# Patient Record
Sex: MALE | Race: WHITE | NOT HISPANIC OR LATINO | Employment: OTHER | URBAN - NONMETROPOLITAN AREA
[De-identification: names, ages, dates, MRNs, and addresses within clinical notes are randomized per-mention and may not be internally consistent; named-entity substitution may affect disease eponyms.]

---

## 2017-03-20 ENCOUNTER — COMMUNICATION - GICH (OUTPATIENT)
Dept: FAMILY MEDICINE | Facility: OTHER | Age: 72
End: 2017-03-20

## 2017-03-20 DIAGNOSIS — I10 ESSENTIAL (PRIMARY) HYPERTENSION: ICD-10-CM

## 2017-07-12 ENCOUNTER — AMBULATORY - GICH (OUTPATIENT)
Dept: SCHEDULING | Facility: OTHER | Age: 72
End: 2017-07-12

## 2017-08-02 ENCOUNTER — OFFICE VISIT - GICH (OUTPATIENT)
Dept: FAMILY MEDICINE | Facility: OTHER | Age: 72
End: 2017-08-02

## 2017-08-02 ENCOUNTER — HISTORY (OUTPATIENT)
Dept: FAMILY MEDICINE | Facility: OTHER | Age: 72
End: 2017-08-02

## 2017-08-02 DIAGNOSIS — T82.330A: ICD-10-CM

## 2017-08-02 DIAGNOSIS — Z01.818 ENCOUNTER FOR OTHER PREPROCEDURAL EXAMINATION: ICD-10-CM

## 2017-08-02 LAB
ABSOLUTE BASOPHILS - HISTORICAL: 0 THOU/CU MM
ABSOLUTE EOSINOPHILS - HISTORICAL: 0.5 THOU/CU MM
ABSOLUTE IMMATURE GRANULOCYTES(METAS,MYELOS,PROS) - HISTORICAL: 0 THOU/CU MM
ABSOLUTE LYMPHOCYTES - HISTORICAL: 2.1 THOU/CU MM (ref 0.9–2.9)
ABSOLUTE MONOCYTES - HISTORICAL: 0.6 THOU/CU MM
ABSOLUTE NEUTROPHILS - HISTORICAL: 4.9 THOU/CU MM (ref 1.7–7)
BASOPHILS # BLD AUTO: 0.2 %
CREAT SERPL-MCNC: 1.01 MG/DL (ref 0.7–1.3)
EOSINOPHIL NFR BLD AUTO: 6.5 %
ERYTHROCYTE [DISTWIDTH] IN BLOOD BY AUTOMATED COUNT: 13.6 % (ref 11.5–15.5)
GFR IF NOT AFRICAN AMERICAN - HISTORICAL: >60 ML/MIN/1.73M2
HCT VFR BLD AUTO: 51.6 % (ref 37–53)
HEMOGLOBIN: 17.5 G/DL (ref 13.5–17.5)
IMMATURE GRANULOCYTES(METAS,MYELOS,PROS) - HISTORICAL: 0.2 %
LYMPHOCYTES NFR BLD AUTO: 25.5 % (ref 20–44)
MCH RBC QN AUTO: 32.2 PG (ref 26–34)
MCHC RBC AUTO-ENTMCNC: 33.9 G/DL (ref 32–36)
MCV RBC AUTO: 95 FL (ref 80–100)
MONOCYTES NFR BLD AUTO: 6.8 %
NEUTROPHILS NFR BLD AUTO: 60.8 % (ref 42–72)
PLATELET # BLD AUTO: 197 THOU/CU MM (ref 140–440)
PMV BLD: 9.9 FL (ref 6.5–11)
RED BLOOD COUNT - HISTORICAL: 5.43 MIL/CU MM (ref 4.3–5.9)
WHITE BLOOD COUNT - HISTORICAL: 8.1 THOU/CU MM (ref 4.5–11)

## 2017-08-10 ENCOUNTER — OFFICE VISIT - GICH (OUTPATIENT)
Dept: FAMILY MEDICINE | Facility: OTHER | Age: 72
End: 2017-08-10

## 2017-08-10 ENCOUNTER — HISTORY (OUTPATIENT)
Dept: FAMILY MEDICINE | Facility: OTHER | Age: 72
End: 2017-08-10

## 2017-08-10 ENCOUNTER — AMBULATORY - GICH (OUTPATIENT)
Dept: SCHEDULING | Facility: OTHER | Age: 72
End: 2017-08-10

## 2017-08-10 DIAGNOSIS — L29.89 OTHER PRURITUS: ICD-10-CM

## 2017-08-10 ASSESSMENT — PATIENT HEALTH QUESTIONNAIRE - PHQ9: SUM OF ALL RESPONSES TO PHQ QUESTIONS 1-9: 0

## 2017-08-31 ENCOUNTER — AMBULATORY - GICH (OUTPATIENT)
Dept: SCHEDULING | Facility: OTHER | Age: 72
End: 2017-08-31

## 2017-09-16 ENCOUNTER — COMMUNICATION - GICH (OUTPATIENT)
Dept: FAMILY MEDICINE | Facility: OTHER | Age: 72
End: 2017-09-16

## 2017-09-16 DIAGNOSIS — I10 ESSENTIAL (PRIMARY) HYPERTENSION: ICD-10-CM

## 2017-11-16 ENCOUNTER — COMMUNICATION - GICH (OUTPATIENT)
Dept: FAMILY MEDICINE | Facility: OTHER | Age: 72
End: 2017-11-16

## 2017-11-16 DIAGNOSIS — I10 ESSENTIAL (PRIMARY) HYPERTENSION: ICD-10-CM

## 2017-11-24 ENCOUNTER — COMMUNICATION - GICH (OUTPATIENT)
Dept: FAMILY MEDICINE | Facility: OTHER | Age: 72
End: 2017-11-24

## 2017-11-24 DIAGNOSIS — I10 ESSENTIAL (PRIMARY) HYPERTENSION: ICD-10-CM

## 2017-11-28 ENCOUNTER — AMBULATORY - GICH (OUTPATIENT)
Dept: LAB | Facility: OTHER | Age: 72
End: 2017-11-28

## 2017-11-28 DIAGNOSIS — I10 ESSENTIAL (PRIMARY) HYPERTENSION: ICD-10-CM

## 2017-11-28 LAB
ANION GAP - HISTORICAL: 8 (ref 5–18)
BUN SERPL-MCNC: 16 MG/DL (ref 7–25)
BUN/CREAT RATIO - HISTORICAL: 16
CALCIUM SERPL-MCNC: 9.7 MG/DL (ref 8.6–10.3)
CHLORIDE SERPLBLD-SCNC: 105 MMOL/L (ref 98–107)
CO2 SERPL-SCNC: 26 MMOL/L (ref 21–31)
CREAT SERPL-MCNC: 1.01 MG/DL (ref 0.7–1.3)
GFR IF NOT AFRICAN AMERICAN - HISTORICAL: >60 ML/MIN/1.73M2
GLUCOSE SERPL-MCNC: 119 MG/DL (ref 70–105)
POTASSIUM SERPL-SCNC: 4 MMOL/L (ref 3.5–5.1)
SODIUM SERPL-SCNC: 139 MMOL/L (ref 133–143)

## 2017-12-28 NOTE — TELEPHONE ENCOUNTER
Patient Information     Patient Name MRN Faustino Suggs [id number] Male 1945      Telephone Encounter by Kim Coto RN at 2017  9:59 AM     Author:  Kim Coto RN Service:  (none) Author Type:  NURS- Registered Nurse     Filed:  2017 10:00 AM Encounter Date:  2017 Status:  Signed     :  Kim Coto RN (NURS- Registered Nurse)            Spoke with Patient and let him know that he needs to make a lab only appointment to have his potassium level checked. Next refill can be filled as appropriate through August once this has been completed. Patient transferred to scheduling line to set up lab-only appointment. Kim Coot RN .............. 2017  10:00 AM

## 2017-12-28 NOTE — ADDENDUM NOTE
Patient Information     Patient Name MRN Faustino Suggs [id number] Male 1945      Addendum Note by Diana Sargent MD at 2017  8:14 AM     Author:  Diana Sargent MD Service:  (none) Author Type:  Physician     Filed:  2017  8:14 AM Encounter Date:  2017 Status:  Signed     :  Diana Sargent MD (Physician)       Addended by: DIANA SARGENT on: 2017 08:14 AM        Modules accepted: Orders

## 2017-12-28 NOTE — TELEPHONE ENCOUNTER
Patient Information     Patient Name MRN Sex Faustino Noyola [id number] Male 1945      Telephone Encounter by Kim Coto RN at 2017 10:33 AM     Author:  Kim Coto RN Service:  (none) Author Type:  NURS- Registered Nurse     Filed:  2017 11:34 AM Encounter Date:  2017 Status:  Signed     :  Kim Coto RN (NURS- Registered Nurse)            Ace Inhibitors    Office visit in the past 12 months or per provider note.    Last visit with DIANA BROWN was on: 08/10/2017 in [facility]  Next visit with DIANA BROWN is on: No future appointment listed with this provider  Next visit with Family Practice is on: No future appointment listed in this department    Lab test requirements:  Creatinine and Potassium annually, if ordering lab, order BMP.  CREATININE (mg/dL)    Date Value   2017 1.01     POTASSIUM (mmol/L)    Date Value   2016 4.1     Max refill for 12 months from last office visit or per provider note    Patient is due for follow-up lab: Potassium. Limited refill provided at this time. Twined message and/or letter sent for reminder to patient. Prescription refilled per RN Medication Refill Policy.................... Kim Coto RN ....................  2017   11:32 AM

## 2017-12-28 NOTE — PROGRESS NOTES
Patient Information     Patient Name MRN Faustino Suggs [id number] Male 1945      Progress Notes by Tristen Sargent MD at 2017 10:30 AM     Author:  Tristen Sargent MD Service:  (none) Author Type:  Physician     Filed:  8/3/2017  1:38 PM Encounter Date:  2017 Status:  Signed     :  Tristen Sargent MD (Physician)            .

## 2017-12-28 NOTE — TELEPHONE ENCOUNTER
Patient Information     Patient Name MRFaustino Brasher [id number] Male 1945      Telephone Encounter by Kim Coto RN at 2017  1:21 PM     Author:  Kim Coto RN Service:  (none) Author Type:  NURS- Registered Nurse     Filed:  2017  1:25 PM Encounter Date:  2017 Status:  Signed     :  Kim Coto RN (NURS- Registered Nurse)            Beta Blockers     Office visit in the past 12 months or per provider note.    Last visit with DIANA BROWN was on: 08/10/2017 in [facility]  Next visit with DIANA BROWN is on: No future appointment listed with this provider  Next visit with Family Practice is on: No future appointment listed in this department    Max refill for 12 months from last office visit or per provider note.    Prescription refilled per RN Medication Refill Policy.................... Kim Coto RN ....................  2017   1:24 PM

## 2017-12-28 NOTE — TELEPHONE ENCOUNTER
Patient Information     Patient Name MRN Faustino Suggs [id number] Male 1945      Telephone Encounter by Yulia De La Paz at 2017  3:35 PM     Author:  Yulia De La Paz Service:  (none) Author Type:  (none)     Filed:  2017  3:37 PM Encounter Date:  2017 Status:  Signed     :  Yulia De La Paz            Spoke with patient he was under the understanding that both his meds would be refilled for the year at the time of his Pre-op apt. In August. Please advise. Yulia De La Paz LPN......................2017 3:36 PM

## 2017-12-28 NOTE — TELEPHONE ENCOUNTER
Patient Information     Patient Name MRN Faustino Suggs [id number] Male 1945      Telephone Encounter by Kim Coto RN at 2017  8:39 AM     Author:  Kim Coto RN Service:  (none) Author Type:  NURS- Registered Nurse     Filed:  2017  8:41 AM Encounter Date:  2017 Status:  Signed     :  Kim Coto RN (NURS- Registered Nurse)            Patient was sent letter because Potassium hasn't been checked in over one year. If you would like Patient to set up lab only appointment, please place orders and route to nursing for Patient notification. Kim Coto RN .............. 2017  8:40 AM

## 2017-12-28 NOTE — PROGRESS NOTES
Patient Information     Patient Name MRN Sex Faustino Noyola [id number] Male 1945      Progress Notes by Tristen Sargent MD at 8/10/2017  1:15 PM     Author:  Tristen Sargent MD Service:  (none) Author Type:  Physician     Filed:  8/10/2017  1:33 PM Encounter Date:  8/10/2017 Status:  Signed     :  Tristen Sargent MD (Physician)            SUBJECTIVE:    Faustino Diallo is a 71 y.o. male who presents for follow-up jock rash    HPI Comments: Patient arrives here for follow-up jock rash. He has been using Lamisil with fairly decent result although he still has quite a bit of redness. Is also changed to boxer shorts. States it seems to improve. Surgery was canceled. Rescheduled for the .      No Known Allergies,   Family History       Problem   Relation Age of Onset     Other  Mother      Parkinson's disease       Other  Father      dementia       Other  Mother       dementia       Stroke  Father     and   Current Outpatient Prescriptions on File Prior to Visit       Medication  Sig Dispense Refill     aspirin enteric coated 81 mg tablet Take 1 tablet by mouth once daily with a meal.  0     lisinopril (PRINIVIL; ZESTRIL) 20 mg tablet Take 1 tablet by mouth once daily. 90 tablet 1     metoprolol tartrate (LOPRESSOR) 50 mg tablet Take 1 tablet by mouth 2 times daily. 180 tablet 4     MULTIVIT-MIN/FA/LYCOPEN/LUTEIN (CENTRUM SILVER MEN ORAL) Take 1 tablet by mouth once daily.       No current facility-administered medications on file prior to visit.        REVIEW OF SYSTEMS:  ROS    OBJECTIVE:  /100  Pulse 84  Wt 92.3 kg (203 lb 6.4 oz)  BMI 26.12 kg/m2    EXAM:   Physical Exam   Constitutional: He is well-developed, well-nourished, and in no distress.   Skin:   The redness has improved. Markedly improved on the left.       ASSESSMENT/PLAN:    ICD-10-CM    1. Jock itch L29.8 fluconazole (DIFLUCAN) 200 mg tablet        Plan:  This shows some improvement. Treat one week of Diflucan. Hopefully the  improvement will continue. Follow-up as needed

## 2017-12-29 NOTE — H&P
Patient Information     Patient Name MRN Faustino Suggs [id number] Male 1945      H&P by Tristen Sargent MD at 2017 10:30 AM     Author:  Tristen Sargent MD Service:  (none) Author Type:  Physician     Filed:  8/3/2017  1:38 PM Encounter Date:  2017 Status:  Signed     :  Tristen Sargent MD (Physician)            PREOPERATIVE Anesthesia Medical Evaluation  Date of Exam: 2017    Nursing Notes:   Inés Lopez  2017 10:39 AM  Signed  Patient here for preop having an angiogram on 08/10/17 with  in [place]. He is due for refills on medications, due for updated EKG today. Inés Lopez LPN .......................2017  10:36 AM       HPI:  Patient is here at the request of Dr. Coyle  prior to undergoing angiography surgery on date noted above. 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 He'll be undergoing surgery to repair it. Surgery is scheduled to be done August 10.       Proposed anesthesia: gen  Anesthesia Complications: no  History of abnormal bleeding : no  History of Blood Transfusions: no      Cards risk factors:     History      Smoking Status        Current Every Day Smoker       Packs/day:  0.50     Years:  45.00   Smokeless Tobacco        Never Used       Comment: enc. to stop    ,   LDL CHOLESTEROL (mg/dL)    Date Value   2015 131 (H)   ,   BP Readings from Last 1 Encounters:    17 134/80     ,   GLUCOSE (mg/dL)    Date Value   2016 89   , No results found for: HGBA1C,  no early heart history in family    CPR: yes  Allergies: Review of patient's allergies indicates no known allergies.   Latex Allergy: no  Immunization History     Administered  Date(s) Administered     Influenza Virus, Unspecified 2008, 2012     Influenza, IIV3 (Age >=3 years) 10/02/2013, 2014         Preoperative Evaluation: Obstructive Sleep Apnea screening    S: Snore -  Do you snore loudly? (louder than  "talking or loud enough to be heard through closed doors)(NO)  T: Tired - Do you often feel tired, fatigued, or sleepy during the daytime?(NO)  O: Observed - Has anyone ever observed you stop breathing during your sleep?(NO)  P: Pressure - Do you have or are you being treated for high blood pressure?(YES)  B: BMI - BMI greater than 35kg/m2?(NO)  A: Age - Age over 50 years old?(YES)  N: Neck - Neck circumference greater than 40 cm?(NO)  G: Gender - Gender: Male?(YES)  Total number of \"YES\" responses:  3    Scoring: Low risk of SAGE 0-2  At Risk of SAGE: >3 High Risk of SAGE: 5-8      Problem List:   Patient Active Problem List     Diagnosis  Code     NEPHROLITHIASIS S/P  ESWL 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  Patient declined.       Past Surgical History:      Procedure  Laterality Date     ABDOMINAL AORTIC ANEURYSM REPAIR      endovascular with right renal       CYSTOSCOPY      cystoscopy with IV sedation       LITHOTRIPSY       Social History     Social History        Marital status:  Single     Spouse name: N/A     Number of children:  N/A     Years of education:  N/A     Occupational History      Not on file.     Social History Main Topics          Smoking status:   Current Every Day Smoker      Packs/day:  0.50      Years:  45.00      Smokeless tobacco:   Never Used       Comment: enc. to stop       Alcohol use   3.5 oz/week     7 drink(s) per week        Comment: occasional       Drug use:   No      Sexual activity:   Not on file      Other Topics  Concern     Not on file      Social History Narrative     He lives alone in [place].     Patient currently smokes. 1/2  ppd    Alcohol Use - yes 2 per " "night    retired  [employer] divorce times two no children        Preload  4/16/2013        Family History       Problem   Relation Age of Onset     Other  Mother      Parkinson's disease       Other  Father      dementia       Other  Mother       dementia       Stroke  Father         Current Medications:  Current Outpatient Rx       Medication  Sig Dispense Refill     aspirin enteric coated 81 mg tablet Take 1 tablet by mouth once daily with a meal.  0     lisinopril (PRINIVIL; ZESTRIL) 20 mg tablet Take 1 tablet by mouth once daily. 90 tablet 1     metoprolol tartrate (LOPRESSOR) 50 mg tablet Take 1 tablet by mouth 2 times daily. 180 tablet 4     MULTIVIT-MIN/FA/LYCOPEN/LUTEIN (CENTRUM SILVER MEN ORAL) Take 1 tablet by mouth once daily.       Medications have been reviewed by me and are current to the best of my knowledge and ability.    Recent use of: aspirin    HABITS:     Health Care Directive or Living Will: no    REVIEW OF SYSTEMS:  Fever/Chills or other infectious symptoms in past month:  (NO)   >10lb weight loss in past two months:  (NO)   General: Denies general constitutional problems  Eyes: Denies problems  Ears/Nose/Throat: Denies problems  Respiratory: Denies problems  Gastrointestinal: Denies problems  Musculoskeletal: Denies problems  Skin: Patient reports a long-term skin rash around his groin area. States is been there for years.  Neurologic: Denies problems  Psychiatric: Denies problems  Endocrine: Denies problems     EXAM:   /80  Pulse 72  Ht 1.88 m (6' 2\")  Wt 90.7 kg (200 lb)  BMI 25.68 kg/m2 Body mass index is 25.68 kg/(m^2).  General Appearance: Normal., Pleasant, alert, appropriate appearance for age. No acute distress  Head Exam: Normocephalic, without obvious abnormality.  Eye Exam: Normal external eye, conjunctiva, lids, cornea. AIXA.  Ear Exam: Normal TM's bilaterally. Normal auditory canals and external ears. Non-tender.  OroPharynx Exam: Dental hygiene adequate. Normal " buccal mucosa. Normal pharynx.  Chest/Respiratory Exam: Normal chest wall and respirations. Clear to auscultation.  Cardiovascular Exam: Regular rate and rhythm. S1, S2, no murmur, click, gallop, or rubs.  Gastrointestinal Exam: Soft, nontender, no abnormal masses or organomegaly.  Musculoskeletal Exam: Back is straight and non-tender, full ROM of upper and lower extremities.  Skin: Patient has a well demarcated rash erythematous in nature site scaling around his groin area. Bilateral consistent with a yeast dermatitis.    DIAGNOSTICS:   1. EKG:  appears normal, NSR  3. Labs:   Results for orders placed or performed in visit on 08/02/17       CREATININE       Result  Value Ref Range Status    CREATININE 1.01 0.70 - 1.30 mg/dL Final    GFR if African American >60 >60 ml/min/1.73m2 Final    GFR if not African American >60 >60 ml/min/1.73m2 Final   CBC WITH AUTO DIFFERENTIAL       Result  Value Ref Range Status    WHITE BLOOD COUNT         8.1 4.5 - 11.0 thou/cu mm Final    RED BLOOD COUNT           5.43 4.30 - 5.90 mil/cu mm Final    HEMOGLOBIN                17.5 13.5 - 17.5 g/dL Final    HEMATOCRIT                51.6 37.0 - 53.0 % Final    MCV                       95 80 - 100 fL Final    MCH                       32.2 26.0 - 34.0 pg Final    MCHC                      33.9 32.0 - 36.0 g/dL Final    RDW                       13.6 11.5 - 15.5 % Final    PLATELET COUNT            197 140 - 440 thou/cu mm Final    MPV                       9.9 6.5 - 11.0 fL Final    NEUTROPHILS               60.8 42.0 - 72.0 % Final    LYMPHOCYTES               25.5 20.0 - 44.0 % Final    MONOCYTES                 6.8 <12.0 % Final    EOSINOPHILS               6.5 <8.0 % Final    BASOPHILS                 0.2 <3.0 % Final    IMMATURE GRANULOCYTES(METAS,MYELOS,PROS) 0.2 % Final    ABSOLUTE NEUTROPHILS      4.9 1.7 - 7.0 thou/cu mm Final    ABSOLUTE LYMPHOCYTES      2.1 0.9 - 2.9 thou/cu mm Final    ABSOLUTE MONOCYTES        0.6 <0.9  thou/cu mm Final    ABSOLUTE EOSINOPHILS      0.5 (H) <0.5 thou/cu mm Final    ABSOLUTE BASOPHILS        0.0 <0.3 thou/cu mm Final    ABSOLUTE IMMATURE GRANULOCYTES(METAS,MYELOS,PROS) 0.0 <=0.3 thou/cu mm Final     laboratory reviewed in satisfactory  IMPRESSION:   Satisfactory Preoperative Anesthesia Medical Evaluation;    For above listed surgery and anesthesia:   Patient is low risk for perioperative complications.    Patient is on chronic pain medications (NO);   Patient is on antiplatlet/anticoagulation (YES) patient advised to continue the aspirin by his surgeon.  Other medications that need adjustment perioperatively (NO)        Patient may proceed with surgery pending approval by his surgeon concerning his yeast dermatitis with appropriate anesthesia.  Labs/ Ekg to be faxed to surgeon's office.   Patient informed NPO after midnight night prior to surgery, to avoid NSAIDS, ASA, fish oil, and flax seed  over the counter ten days prior to surgery.   If  febrile illness or productive cough, please contact the surgeon's office.             1. Preop examination      2. Endoleak of aortic graft (HC)    3  yeast dermatitis    Patient does have a L phone number for his surgeon and will contact his surgeon concerning his yeast dermatitis whether it would prevent him from proceeding with surgery. In the meantime patient will use Lamisil to his groin area. If patient has trouble getting a hold of his surgeon's office he will contact me.         Electronically Signed by: Tristen Sargent MD ....................  8/3/2017   1:37 PM   8/2/2017

## 2017-12-30 NOTE — NURSING NOTE
Patient Information     Patient Name MRN Faustino Suggs [id number] Male 1945      Nursing Note by Inés Lopez at 2017 10:30 AM     Author:  Inés Lopez Service:  (none) Author Type:  (none)     Filed:  2017 10:39 AM Encounter Date:  2017 Status:  Signed     :  Inés Lopez            Patient here for preop having an angiogram on 08/10/17 with  in [place]. He is due for refills on medications, due for updated EKG today. Inés Lopez LPN .......................2017  10:36 AM

## 2018-01-03 NOTE — TELEPHONE ENCOUNTER
Patient Information     Patient Name MRN Faustino Suggs [id number] Male 1945      Telephone Encounter by Bernadine Cortés RN at 3/20/2017  4:53 PM     Author:  Bernadine Cortés RN  Service:  (none) Author Type:  NURS- Registered Nurse     Filed:  3/21/2017  8:49 AM  Encounter Date:  3/20/2017 Status:  Addendum     :  Bernadine Cortés RN (NURS- Registered Nurse)        Related Notes: Original Note by Bernadine Cortés RN (NURS- Registered Nurse) filed at 3/20/2017  4:58 PM            Patient has not had follow up since dosage change. Should he be seen for office visit or Nurse blood pressure check? Please advise.    Ace Inhibitors    Office visit in the past 12 months or per provider note.    Last visit with DIANA BROWN was on: 2016 in [facility]  Next visit with DIANA BROWN is on: No future appointment listed with this provider  Next visit with Family Practice is on: No future appointment listed in this department    Lab test requirements:  Creatinine and Potassium annually, if ordering lab, order BMP.  CREATININE (mg/dL)    Date Value   2016 1.03     POTASSIUM (mmol/L)    Date Value   2016 4.1     BP Readings from Last 4 Encounters:    16 160/100   05/22/15 145/72   04/26/15 (!) 168/104   14 (!) 170/113       Max refill for 12 months from last office visit or per provider note    Prescription refilled per RN Medication Refill Policy.................... Bernadine Cortés RN ....................  3/21/2017   8:48 AM

## 2018-01-25 ENCOUNTER — DOCUMENTATION ONLY (OUTPATIENT)
Dept: FAMILY MEDICINE | Facility: OTHER | Age: 73
End: 2018-01-25

## 2018-01-25 VITALS
WEIGHT: 200 LBS | DIASTOLIC BLOOD PRESSURE: 100 MMHG | HEART RATE: 84 BPM | BODY MASS INDEX: 25.67 KG/M2 | WEIGHT: 203.4 LBS | HEIGHT: 74 IN | HEART RATE: 72 BPM | BODY MASS INDEX: 26.12 KG/M2 | SYSTOLIC BLOOD PRESSURE: 134 MMHG | SYSTOLIC BLOOD PRESSURE: 162 MMHG | DIASTOLIC BLOOD PRESSURE: 80 MMHG

## 2018-01-25 PROBLEM — I10 ESSENTIAL HYPERTENSION: Status: ACTIVE | Noted: 2018-01-25

## 2018-01-25 PROBLEM — B35.6 JOCK ITCH: Status: ACTIVE | Noted: 2017-08-10

## 2018-01-25 PROBLEM — I45.9 CARDIAC CONDUCTION DISORDER: Status: ACTIVE | Noted: 2018-01-25

## 2018-01-25 RX ORDER — METOPROLOL TARTRATE 50 MG
50 TABLET ORAL 2 TIMES DAILY
COMMUNITY
Start: 2017-11-27 | End: 2018-08-02

## 2018-01-25 RX ORDER — LISINOPRIL 20 MG/1
20 TABLET ORAL DAILY
COMMUNITY
Start: 2017-11-27 | End: 2018-08-02

## 2018-01-25 RX ORDER — ASPIRIN 81 MG/1
81 TABLET ORAL
COMMUNITY
Start: 2013-03-19 | End: 2022-01-01

## 2018-02-03 ASSESSMENT — PATIENT HEALTH QUESTIONNAIRE - PHQ9
SUM OF ALL RESPONSES TO PHQ QUESTIONS 1-9: 0
SUM OF ALL RESPONSES TO PHQ QUESTIONS 1-9: 0

## 2018-07-23 NOTE — PROGRESS NOTES
Patient Information     Patient Name  Faustino Diallo MRN  [id number] Sex  Male   1945      Letter by Tristen Sargent MD at      Author:  Tristen Sargent MD Service:  (none) Author Type:  (none)    Filed:   Encounter Date:  2017 Status:  (Other)           Faustino Diallo  [address]  [facility] [address]          2017    Dear Mr. Diallo:    This letter is to remind you that you are due for follow-up Potassium lab draw with Tristen Sargent MD. Your last comprehensive visit was more than 12 months ago.    A LIMITED refill of         lisinopril (PRINIVIL; ZESTRIL) 20 mg tablet     has been called into your pharmacy. Additional refills require you to complete this appointment.    Please call the clinic at [phone] to schedule your appointment.    If you should require additional refills before your scheduled appointment, please contact your pharmacy and we will refill your medication until the date of your appointment.    If you are no longer seeing Tristen Sargent MD for primary care, please call to let us know. Doing so will remove you from our call/contact list.      Thank you for choosing [facility] and [facility] for your health care needs.    Sincerely,    Refill RN  [facility]

## 2018-07-23 NOTE — PROGRESS NOTES
Patient Information     Patient Name  Faustino Diallo MRN  [id number] Sex  Male   1945      Letter by Tristen Sargent MD at      Author:  Tristen Sargent MD Service:  (none) Author Type:  (none)    Filed:   Encounter Date:  2017 Status:  (Other)           Faustino Diallo  [address]   [place] [address]          2017    Dear Mr. Diallo:    Enclosed is a copy of your laboratory for review and as you can see it did come back satisfactory. Please call if you should have any questions.  Results for orders placed or performed in visit on 17       BASIC METABOLIC PANEL       Result  Value Ref Range Status    SODIUM 139 133 - 143 mmol/L Final    POTASSIUM 4.0 3.5 - 5.1 mmol/L Final    CHLORIDE 105 98 - 107 mmol/L Final    CO2,TOTAL 26 21 - 31 mmol/L Final    ANION GAP 8 5 - 18                 Final    GLUCOSE 119 (H) 70 - 105 mg/dL Final    CALCIUM 9.7 8.6 - 10.3 mg/dL Final    BUN 16 7 - 25 mg/dL Final    CREATININE 1.01 0.70 - 1.30 mg/dL Final    BUN/CREAT RATIO           16                 Final    GFR if African American >60 >60 ml/min/1.73m2 Final    GFR if not African American >60 >60 ml/min/1.73m2 Final     Tristen Sargent MD ....................  2017   4:19 PM

## 2018-07-24 NOTE — PROGRESS NOTES
Patient Information     Patient Name  Faustino Diallo MRN  [id number] Sex  Male   1945      Letter by Tristen Sargent MD at      Author:  Tristen Sargent MD Service:  (none) Author Type:  (none)    Filed:   Encounter Date:  2017 Status:  (Other)           Faustino Diallo  [address]  [place] [address]          August 3, 2017    Dear Mr. Diallo:    Enclosed is a copy of your laboratory testing which did come back satisfactory. Please call if you should have any questions.  Results for orders placed or performed in visit on 17       CREATININE       Result  Value Ref Range Status    CREATININE 1.01 0.70 - 1.30 mg/dL Final    GFR if African American >60 >60 ml/min/1.73m2 Final    GFR if not African American >60 >60 ml/min/1.73m2 Final   CBC WITH AUTO DIFFERENTIAL       Result  Value Ref Range Status    WHITE BLOOD COUNT         8.1 4.5 - 11.0 thou/cu mm Final    RED BLOOD COUNT           5.43 4.30 - 5.90 mil/cu mm Final    HEMOGLOBIN                17.5 13.5 - 17.5 g/dL Final    HEMATOCRIT                51.6 37.0 - 53.0 % Final    MCV                       95 80 - 100 fL Final    MCH                       32.2 26.0 - 34.0 pg Final    MCHC                      33.9 32.0 - 36.0 g/dL Final    RDW                       13.6 11.5 - 15.5 % Final    PLATELET COUNT            197 140 - 440 thou/cu mm Final    MPV                       9.9 6.5 - 11.0 fL Final    NEUTROPHILS               60.8 42.0 - 72.0 % Final    LYMPHOCYTES               25.5 20.0 - 44.0 % Final    MONOCYTES                 6.8 <12.0 % Final    EOSINOPHILS               6.5 <8.0 % Final    BASOPHILS                 0.2 <3.0 % Final    IMMATURE GRANULOCYTES(METAS,MYELOS,PROS) 0.2 % Final    ABSOLUTE NEUTROPHILS      4.9 1.7 - 7.0 thou/cu mm Final    ABSOLUTE LYMPHOCYTES      2.1 0.9 - 2.9 thou/cu mm Final    ABSOLUTE MONOCYTES        0.6 <0.9 thou/cu mm Final    ABSOLUTE EOSINOPHILS      0.5 (H) <0.5 thou/cu mm Final    ABSOLUTE  BASOPHILS        0.0 <0.3 thou/cu mm Final    ABSOLUTE IMMATURE GRANULOCYTES(METAS,MYELOS,PROS) 0.0 <=0.3 thou/cu mm Final     Tristen Sargent MD ....................  8/3/2017   4:18 PM

## 2018-08-02 ENCOUNTER — MEDICAL CORRESPONDENCE (OUTPATIENT)
Dept: HEALTH INFORMATION MANAGEMENT | Facility: OTHER | Age: 73
End: 2018-08-02

## 2018-08-02 ENCOUNTER — OFFICE VISIT (OUTPATIENT)
Dept: FAMILY MEDICINE | Facility: OTHER | Age: 73
End: 2018-08-02
Attending: FAMILY MEDICINE
Payer: MEDICARE

## 2018-08-02 VITALS
WEIGHT: 196 LBS | OXYGEN SATURATION: 99 % | DIASTOLIC BLOOD PRESSURE: 78 MMHG | SYSTOLIC BLOOD PRESSURE: 122 MMHG | HEART RATE: 63 BPM | HEIGHT: 75 IN | BODY MASS INDEX: 24.37 KG/M2

## 2018-08-02 DIAGNOSIS — H25.89 OTHER AGE-RELATED CATARACT OF RIGHT EYE: ICD-10-CM

## 2018-08-02 DIAGNOSIS — R97.20 ELEVATED PSA: ICD-10-CM

## 2018-08-02 DIAGNOSIS — I10 ESSENTIAL HYPERTENSION: ICD-10-CM

## 2018-08-02 DIAGNOSIS — Z01.818 PRE-OP EXAM: Primary | ICD-10-CM

## 2018-08-02 DIAGNOSIS — Z53.20 COLONOSCOPY REFUSED: ICD-10-CM

## 2018-08-02 PROBLEM — B35.6 JOCK ITCH: Status: RESOLVED | Noted: 2017-08-10 | Resolved: 2018-08-02

## 2018-08-02 LAB
ANION GAP SERPL CALCULATED.3IONS-SCNC: 6 MMOL/L (ref 3–14)
BUN SERPL-MCNC: 14 MG/DL (ref 7–25)
CALCIUM SERPL-MCNC: 9.5 MG/DL (ref 8.6–10.3)
CHLORIDE SERPL-SCNC: 105 MMOL/L (ref 98–107)
CO2 SERPL-SCNC: 29 MMOL/L (ref 21–31)
CREAT SERPL-MCNC: 0.95 MG/DL (ref 0.7–1.3)
GFR SERPL CREATININE-BSD FRML MDRD: 78 ML/MIN/1.7M2
GLUCOSE SERPL-MCNC: 100 MG/DL (ref 70–105)
POTASSIUM SERPL-SCNC: 4.2 MMOL/L (ref 3.5–5.1)
SODIUM SERPL-SCNC: 140 MMOL/L (ref 134–144)

## 2018-08-02 PROCEDURE — 90670 PCV13 VACCINE IM: CPT | Performed by: FAMILY MEDICINE

## 2018-08-02 PROCEDURE — G0009 ADMIN PNEUMOCOCCAL VACCINE: HCPCS

## 2018-08-02 PROCEDURE — G0463 HOSPITAL OUTPT CLINIC VISIT: HCPCS | Mod: 25

## 2018-08-02 PROCEDURE — 99214 OFFICE O/P EST MOD 30 MIN: CPT | Mod: 25 | Performed by: FAMILY MEDICINE

## 2018-08-02 PROCEDURE — G0463 HOSPITAL OUTPT CLINIC VISIT: HCPCS

## 2018-08-02 PROCEDURE — 80048 BASIC METABOLIC PNL TOTAL CA: CPT | Performed by: FAMILY MEDICINE

## 2018-08-02 PROCEDURE — 36415 COLL VENOUS BLD VENIPUNCTURE: CPT | Performed by: FAMILY MEDICINE

## 2018-08-02 PROCEDURE — 93010 ELECTROCARDIOGRAM REPORT: CPT | Performed by: INTERNAL MEDICINE

## 2018-08-02 PROCEDURE — 93005 ELECTROCARDIOGRAM TRACING: CPT

## 2018-08-02 PROCEDURE — 96372 THER/PROPH/DIAG INJ SC/IM: CPT

## 2018-08-02 RX ORDER — LISINOPRIL 20 MG/1
20 TABLET ORAL DAILY
Qty: 90 TABLET | Refills: 3 | Status: SHIPPED | OUTPATIENT
Start: 2018-08-02 | End: 2019-10-02

## 2018-08-02 RX ORDER — METOPROLOL TARTRATE 50 MG
50 TABLET ORAL 2 TIMES DAILY
Qty: 180 TABLET | Refills: 3 | Status: SHIPPED | OUTPATIENT
Start: 2018-08-02 | End: 2019-10-02

## 2018-08-02 ASSESSMENT — ANXIETY QUESTIONNAIRES
3. WORRYING TOO MUCH ABOUT DIFFERENT THINGS: NOT AT ALL
2. NOT BEING ABLE TO STOP OR CONTROL WORRYING: NOT AT ALL
5. BEING SO RESTLESS THAT IT IS HARD TO SIT STILL: NOT AT ALL
GAD7 TOTAL SCORE: 0
7. FEELING AFRAID AS IF SOMETHING AWFUL MIGHT HAPPEN: NOT AT ALL
1. FEELING NERVOUS, ANXIOUS, OR ON EDGE: NOT AT ALL
6. BECOMING EASILY ANNOYED OR IRRITABLE: NOT AT ALL

## 2018-08-02 ASSESSMENT — PATIENT HEALTH QUESTIONNAIRE - PHQ9: 5. POOR APPETITE OR OVEREATING: NOT AT ALL

## 2018-08-02 NOTE — PROGRESS NOTES
----------------- PREOPERATIVE EXAM ------------------  8/2/2018    SUBJECTIVE:  Faustino Diallo is a 72 year old male here for preoperative optimization.    I was asked to see Faustino Diallo by Dr. Parker for preoperative optimization due to htn    Date of Surgery: 22 aug  Type of Surgery: cataract    Hospital:  [facility]    HPI: Patient arrives here for cataract surgery preop.  He states that he has been having trouble with the eye for some time.  States that it feels like a fog over it.  He has trouble focusing.  Chart reviewed showing he has not had a PSA which he declined again this visit.  Nor colonoscopy which he again refused.  Was agreeable to proceed with a colon guard.      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  Patient declined.       Past Surgical History:   Procedure Laterality Date     CYSTOSCOPY      cystoscopy with IV sedation     EXTRACORPOREAL SHOCK WAVE LITHOTRIPSY (ESWL)      No Comments Provided     OTHER SURGICAL HISTORY      ,ABDOMINAL AORTIC ANEURYSM REPAIR,endovascular with right renal       Family  "History   Problem Relation Age of Onset     Other - See Comments Father      dementia/Stroke     Other - See Comments Mother      Parkinson's disease/ dementia       Social History   Substance Use Topics     Smoking status: Current Every Day Smoker     Packs/day: 0.50     Years: 45.00     Smokeless tobacco: Never Used      Comment: Quit smoking: enc. to stop     Alcohol use 3.5 oz/week      Comment: Alcoholic Drinks/day: occasional       Current Outpatient Prescriptions   Medication Sig Dispense Refill     aspirin EC 81 MG EC tablet Take 81 mg by mouth daily with food       lisinopril (PRINIVIL/ZESTRIL) 20 MG tablet Take 1 tablet (20 mg) by mouth daily 90 tablet 3     metoprolol tartrate (LOPRESSOR) 50 MG tablet Take 1 tablet (50 mg) by mouth 2 times daily 180 tablet 3     Multiple Vitamins-Minerals (CENTRUM SILVER 50+MEN PO) Take 1 tablet by mouth daily       [DISCONTINUED] lisinopril (PRINIVIL/ZESTRIL) 20 MG tablet Take 20 mg by mouth daily       [DISCONTINUED] metoprolol tartrate (LOPRESSOR) 50 MG tablet Take 50 mg by mouth 2 times daily         Allergies:  Allergies   Allergen Reactions     Seasonal Allergies Other (See Comments)     Seasonal allergie       ROS:    Surgical:  patient denies previous complications from prior surgeries including but not limited to prolonged bleeding, anesthesia complications, dysrhythmias, surgical wound infections, or prolonged hospital stay.  Patient does report a AAA stent    Denies family hx of bleeding tendencies, anesthesia complications, or other problems with surgery.     -------------------------------------------------------------    PHYSICAL EXAM:  /78 (BP Location: Right arm, Patient Position: Sitting)  Pulse 63  Ht 6' 2.5\" (1.892 m)  Wt 196 lb (88.9 kg)  SpO2 99%  BMI 24.83 kg/m2    EXAM:  General Appearance: Pleasant, alert, appropriate appearance for age. No acute distress  Head Exam: Normal. Normocephalic, atraumatic.  Eyes: PERRL, EOMI  Ears: Normal " TM's bilaterally. Normal auditory canals and external ears.   OroPharynx: Normal buccal mucosa. Normal pharynx.  Neck: Supple, no masses or nodes, no lymphadenopathy.  No thyromegaly.  Lungs: Normal chest wall and respirations. Clear to auscultation, no wheezes or crackles.  Cardiovascular: Regular rate and rhythm. S1, S2, no murmurs.  Gastrointestinal: Soft, nontender, no abnormal masses or organomegaly. BS normal   Musculoskeletal: No edema.  Skin: no concerning or new rashes.  Psychiatric Exam: Alert and oriented, appropriate affect.      EKG:  normal EKG, normal sinus rhythm from August 2, 2017  ---------------------------------------------------------------  LABS  Results for orders placed or performed in visit on 11/28/17   Basic metabolic panel   Result Value Ref Range    Glucose 119 (H) 70 - 105 mg/dL    GFR If Not  - Historical >60 >60 ml/min/1.73m2    Potassium 4.0 3.5 - 5.1 mmol/L    Calcium 9.7 8.6 - 10.3 mg/dL    Urea Nitrogen 16 7 - 25 mg/dL    Sodium 139 133 - 143 mmol/L    Anion Gap - Historical 8 5 - 18    Creatinine 1.01 0.70 - 1.30 mg/dL    Chloride 105 98 - 107 mmol/L    Carbon Dioxide 26 21 - 31 mmol/L    GFR Estimate If Black >60 >60 ml/min/1.73m2    BUN/Creatinine Ratio - Historical 16        ASSESSEMENT AND PLAN:    (Z01.818) Pre-op exam  (primary encounter diagnosis)  Comment: Update pneumococcal  Plan: Basic Metabolic Panel,  IMM-  PNEUMOCOCCAL         CONJ VACCINE 13 VALENT IM (Prevnar)            (H25.89) Other age-related cataract of right eye      (I10) Essential hypertension  Comment: Currently under good controlPlan: lisinopril (PRINIVIL/ZESTRIL) 20 MG tablet,         metoprolol tartrate (LOPRESSOR) 50 MG tablet,         Basic Metabolic Panel, GH IMM-  PNEUMOCOCCAL         CONJ VACCINE 13 VALENT IM (Prevnar)      (R97.20) Elevated PSA  Comment: Patient declines further PSAs      (Z53.20) Colonoscopy refused  Patient is given a colo guard form      PRE OP  RECOMMENDATIONS:  Patient is on chronic pain medications no   Patient is on antiplatlet/anticoagulation medication no  Other medications that need adjustment perioperatively no    Other:  Patient was advised to call our office and the surgical services with any change in condition or new symptoms if they were to develop between today and their surgical date.  Especially any cardiopulmonary symptoms or symptoms concerning for an infection.    Tristen Sargent 8/2/2018

## 2018-08-02 NOTE — NURSING NOTE
"Patient here for preop and yearly labs ans refills.   Date of Surgery: 08/22/18   Type of Surgery: right cataract  Surgeon:   Hospital:  [facility]  Fax:     Fever/Chills or other infectious symptoms in past month: no  >10lb weight loss in past two months: no  O2 SAT: 99    Health Care Directive/Code status:  noHx of blood transfusions:   no  Td up to date:  no  History of VRE/MRSA:  no Date:     Preoperative Evaluation: Obstructive Sleep Apnea screening    S: Snore -  Do you snore loudly? (louder than talking or loud enough to be heard through closed doors)no  T: Tired - Do you often feel tired, fatigued, or sleepy during the daytime?no  O: Observed - Has anyone ever observed you stop breathing during your sleep?no  P: Pressure - Do you have or are you being treated for high blood pressure?yes  B: BMI - BMI greater than 35kg/m2?no  A: Age - Age over 50 years old?yes  N: Neck - Neck circumference greater than 40 cm?no  G: Gender - Gender: Male?yes    Total number of \"YES\" responses:  3    Scoring: Low risk of SAGE 0-2  At Risk of SAGE: >3 High Risk of SAGE: 5-8    Inés Lopez 8/2/2018 7:59 AM    "

## 2018-08-02 NOTE — LETTER
August 2, 2018      Faustino Diallo  [address] [place] [address]        Dear ,    We are writing to inform you of your test results.    Your test results fall within the expected range(s) or remain unchanged from previous results.  Please continue with current treatment plan.    Resulted Orders   Basic Metabolic Panel   Result Value Ref Range    Sodium 140 134 - 144 mmol/L    Potassium 4.2 3.5 - 5.1 mmol/L    Chloride 105 98 - 107 mmol/L    Carbon Dioxide 29 21 - 31 mmol/L    Anion Gap 6 3 - 14 mmol/L    Glucose 100 70 - 105 mg/dL    Urea Nitrogen 14 7 - 25 mg/dL    Creatinine 0.95 0.70 - 1.30 mg/dL    GFR Estimate 78 >60 mL/min/1.7m2    GFR Estimate If Black >90 >60 mL/min/1.7m2    Calcium 9.5 8.6 - 10.3 mg/dL       If you have any questions or concerns, please call the clinic at the number listed above.       Sincerely,        Tristen Sargent MD

## 2018-08-02 NOTE — MR AVS SNAPSHOT
"              After Visit Summary   8/2/2018    Faustino Diallo    MRN: [id number]           Patient Information     Date Of Birth          1945        Visit Information        Provider Department      8/2/2018 7:45 AM Tristen Sargent MD [facility]        Today's Diagnoses     Pre-op exam    -  1    Other age-related cataract of right eye        Essential hypertension        Elevated PSA        Colonoscopy refused           Follow-ups after your visit        Who to contact     If you have questions or need follow up information about today's clinic visit or your schedule please contact [facility] directly at [phone].  Normal or non-critical lab and imaging results will be communicated to you by MyChart, letter or phone within 4 business days after the clinic has received the results. If you do not hear from us within 7 days, please contact the clinic through MyChart or phone. If you have a critical or abnormal lab result, we will notify you by phone as soon as possible.  Submit refill requests through [employer] or call your pharmacy and they will forward the refill request to us. Please allow 3 business days for your refill to be completed.          Additional Information About Your Visit        Care EveryWhere ID     This is your Care EveryWhere ID. This could be used by other organizations to access your [facility] medical records  [id number]        Your Vitals Were     Pulse Height Pulse Oximetry BMI (Body Mass Index)          63 6' 2.5\" (1.892 m) 99% 24.83 kg/m2         Blood Pressure from Last 3 Encounters:   08/02/18 122/78   08/10/17 (!) 162/100   08/02/17 134/80    Weight from Last 3 Encounters:   08/02/18 196 lb (88.9 kg)   08/10/17 203 lb 6.4 oz (92.3 kg)   08/02/17 200 lb (90.7 kg)              We Performed the Following     Basic Metabolic Panel     GH IMM-  PNEUMOCOCCAL CONJ VACCINE 13 VALENT IM (Prevnar)          Where to get your medicines      These " medications were sent to [facility] - [place], [place] - [address] [facility]  [address] [place] [address]     Phone:  [phone]     lisinopril 20 MG tablet    metoprolol tartrate 50 MG tablet          Primary Care Provider Office Phone # Fax #    Tristen Sargent [phone]       [address] [facility] [address]        Equal Access to Services     [facility]: Hadii aad ku hadasho Soomaali, waaxda luqadaha, qaybta kaalmada adeegyada, waxay idiin hayaan adeeg kharakristian lanathan . So [facility] [phone].    ATENCIÓN: Si geraldo pacheco, tiene a longoria disposición servicios gratuitos de asistencia lingüística. [facility] [phone].    We comply with applicable federal civil rights laws and [place] laws. We do not discriminate on the basis of race, color, national origin, age, disability, sex, sexual orientation, or gender identity.            Thank you!     Thank you for choosing [facility] [place]  for your care. Our goal is always to provide you with excellent care. Hearing back from our patients is one way we can continue to improve our services. Please take a few minutes to complete the written survey that you may receive in the mail after your visit with us. Thank you!             Your Updated Medication List - Protect others around you: Learn how to safely use, store and throw away your medicines at www.disposemymeds.org.          This list is accurate as of 8/2/18 11:59 PM.  Always use your most recent med list.                   Brand Name Dispense Instructions for use Diagnosis    aspirin 81 MG EC tablet      Take 81 mg by mouth daily with food        CENTRUM SILVER 50+MEN PO      Take 1 tablet by mouth daily        lisinopril 20 MG tablet    PRINIVIL/ZESTRIL    90 tablet    Take 1 tablet (20 mg) by mouth daily    Essential hypertension       metoprolol tartrate 50 MG tablet    LOPRESSOR    180 tablet    Take 1 tablet (50 mg) by mouth 2  times daily    Essential hypertension

## 2018-08-03 ASSESSMENT — ANXIETY QUESTIONNAIRES: GAD7 TOTAL SCORE: 0

## 2018-08-03 ASSESSMENT — PATIENT HEALTH QUESTIONNAIRE - PHQ9: SUM OF ALL RESPONSES TO PHQ QUESTIONS 1-9: 0

## 2018-10-04 ENCOUNTER — TRANSFERRED RECORDS (OUTPATIENT)
Dept: HEALTH INFORMATION MANAGEMENT | Facility: OTHER | Age: 73
End: 2018-10-04

## 2019-10-02 ENCOUNTER — OFFICE VISIT (OUTPATIENT)
Dept: FAMILY MEDICINE | Facility: OTHER | Age: 74
End: 2019-10-02
Attending: FAMILY MEDICINE
Payer: MEDICARE

## 2019-10-02 VITALS
DIASTOLIC BLOOD PRESSURE: 82 MMHG | SYSTOLIC BLOOD PRESSURE: 122 MMHG | HEART RATE: 68 BPM | BODY MASS INDEX: 24.59 KG/M2 | WEIGHT: 191.6 LBS | HEIGHT: 74 IN | TEMPERATURE: 98.6 F | RESPIRATION RATE: 16 BRPM

## 2019-10-02 DIAGNOSIS — I10 ESSENTIAL HYPERTENSION: Primary | ICD-10-CM

## 2019-10-02 LAB
ANION GAP SERPL CALCULATED.3IONS-SCNC: 7 MMOL/L (ref 3–14)
BUN SERPL-MCNC: 15 MG/DL (ref 7–25)
CALCIUM SERPL-MCNC: 9.3 MG/DL (ref 8.6–10.3)
CHLORIDE SERPL-SCNC: 103 MMOL/L (ref 98–107)
CHOLEST SERPL-MCNC: 192 MG/DL
CO2 SERPL-SCNC: 30 MMOL/L (ref 21–31)
CREAT SERPL-MCNC: 1.04 MG/DL (ref 0.7–1.3)
GFR SERPL CREATININE-BSD FRML MDRD: 70 ML/MIN/{1.73_M2}
GLUCOSE SERPL-MCNC: 103 MG/DL (ref 70–105)
HDLC SERPL-MCNC: 91 MG/DL (ref 23–92)
LDLC SERPL CALC-MCNC: 87 MG/DL
NONHDLC SERPL-MCNC: 101 MG/DL
POTASSIUM SERPL-SCNC: 4.1 MMOL/L (ref 3.5–5.1)
SODIUM SERPL-SCNC: 140 MMOL/L (ref 134–144)
TRIGL SERPL-MCNC: 69 MG/DL

## 2019-10-02 PROCEDURE — 36415 COLL VENOUS BLD VENIPUNCTURE: CPT | Mod: ZL | Performed by: FAMILY MEDICINE

## 2019-10-02 PROCEDURE — 90732 PPSV23 VACC 2 YRS+ SUBQ/IM: CPT

## 2019-10-02 PROCEDURE — G0009 ADMIN PNEUMOCOCCAL VACCINE: HCPCS | Performed by: FAMILY MEDICINE

## 2019-10-02 PROCEDURE — 99213 OFFICE O/P EST LOW 20 MIN: CPT | Performed by: FAMILY MEDICINE

## 2019-10-02 PROCEDURE — 80061 LIPID PANEL: CPT | Mod: ZL | Performed by: FAMILY MEDICINE

## 2019-10-02 PROCEDURE — 80048 BASIC METABOLIC PNL TOTAL CA: CPT | Mod: ZL | Performed by: FAMILY MEDICINE

## 2019-10-02 PROCEDURE — G0463 HOSPITAL OUTPT CLINIC VISIT: HCPCS

## 2019-10-02 PROCEDURE — G0008 ADMIN INFLUENZA VIRUS VAC: HCPCS | Performed by: FAMILY MEDICINE

## 2019-10-02 PROCEDURE — G0463 HOSPITAL OUTPT CLINIC VISIT: HCPCS | Mod: 25

## 2019-10-02 PROCEDURE — 90662 IIV NO PRSV INCREASED AG IM: CPT

## 2019-10-02 RX ORDER — METOPROLOL TARTRATE 50 MG
50 TABLET ORAL 2 TIMES DAILY
Qty: 180 TABLET | Refills: 3 | Status: SHIPPED | OUTPATIENT
Start: 2019-10-02 | End: 2020-09-02

## 2019-10-02 RX ORDER — LISINOPRIL 20 MG/1
20 TABLET ORAL DAILY
Qty: 90 TABLET | Refills: 3 | Status: SHIPPED | OUTPATIENT
Start: 2019-10-02 | End: 2020-09-02

## 2019-10-02 ASSESSMENT — MIFFLIN-ST. JEOR: SCORE: 1688.84

## 2019-10-02 ASSESSMENT — ANXIETY QUESTIONNAIRES
6. BECOMING EASILY ANNOYED OR IRRITABLE: NOT AT ALL
4. TROUBLE RELAXING: NOT AT ALL
5. BEING SO RESTLESS THAT IT IS HARD TO SIT STILL: NOT AT ALL
3. WORRYING TOO MUCH ABOUT DIFFERENT THINGS: NOT AT ALL
7. FEELING AFRAID AS IF SOMETHING AWFUL MIGHT HAPPEN: NOT AT ALL
1. FEELING NERVOUS, ANXIOUS, OR ON EDGE: NOT AT ALL
GAD7 TOTAL SCORE: 0
2. NOT BEING ABLE TO STOP OR CONTROL WORRYING: NOT AT ALL

## 2019-10-02 ASSESSMENT — PATIENT HEALTH QUESTIONNAIRE - PHQ9: SUM OF ALL RESPONSES TO PHQ QUESTIONS 1-9: 0

## 2019-10-02 ASSESSMENT — PAIN SCALES - GENERAL: PAINLEVEL: NO PAIN (0)

## 2019-10-02 NOTE — PROGRESS NOTES
"  SUBJECTIVE:   Faustino Diallo is a 73 year old male who presents to clinic today for the following health issues: Medication follow-up    Patient arrives here for medication follow-up.  Blood pressure refills.  Doing well.  Has no complaints.  Blood pressure has been under good control.        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  Patient declined.      Family History   Problem Relation Age of Onset     Other - See Comments Father         dementia/Stroke     Other - See Comments Mother         Parkinson's disease/ dementia       Review of Systems     OBJECTIVE:     /82 (BP Location: Right arm, Patient Position: Sitting, Cuff Size: Adult Large)   Pulse 68   Temp 98.6  F (37  C)   Resp 16   Ht 1.88 m (6' 2\")   Wt 86.9 kg (191 lb 9.6 oz)   BMI 24.60 kg/m    Body mass index is 24.6 kg/m .  Physical Exam    Diagnostic Test Results:  Results for orders placed or performed in visit on 10/02/19 (from the past 24 hour(s))   Lipid Panel   Result Value Ref Range    Cholesterol 192 <200 mg/dL    Triglycerides 69 <150 mg/dL    HDL Cholesterol 91 23 - 92 mg/dL    LDL Cholesterol Calculated 87 <100 mg/dL    Non HDL Cholesterol 101 <130 " mg/dL   Basic Metabolic Panel   Result Value Ref Range    Sodium 140 134 - 144 mmol/L    Potassium 4.1 3.5 - 5.1 mmol/L    Chloride 103 98 - 107 mmol/L    Carbon Dioxide 30 21 - 31 mmol/L    Anion Gap 7 3 - 14 mmol/L    Glucose 103 70 - 105 mg/dL    Urea Nitrogen 15 7 - 25 mg/dL    Creatinine 1.04 0.70 - 1.30 mg/dL    GFR Estimate 70 >60 mL/min/[1.73_m2]    GFR Estimate If Black 85 >60 mL/min/[1.73_m2]    Calcium 9.3 8.6 - 10.3 mg/dL       ASSESSMENT/PLAN:         1. Essential hypertension  Good control refill- metoprolol tartrate (LOPRESSOR) 50 MG tablet; Take 1 tablet (50 mg) by mouth 2 times daily  Dispense: 180 tablet; Refill: 3  - lisinopril (PRINIVIL/ZESTRIL) 20 MG tablet; Take 1 tablet (20 mg) by mouth daily  Dispense: 90 tablet; Refill: 3  - Basic Metabolic Panel; Future  - Lipid Panel; Future  - Lipid Panel  - Basic Metabolic Panel      Tristen Sargent MD  [facility]

## 2019-10-02 NOTE — LETTER
October 3, 2019      Faustino Diallo  [address] [place] [address]        Dear ,    We are writing to inform you of your test results.    Your test results fall within the expected range(s) or remain unchanged from previous results.  Please continue with current treatment plan.    Resulted Orders   Lipid Panel   Result Value Ref Range    Cholesterol 192 <200 mg/dL    Triglycerides 69 <150 mg/dL    HDL Cholesterol 91 23 - 92 mg/dL    LDL Cholesterol Calculated 87 <100 mg/dL      Comment:      Desirable:       <100 mg/dl    Non HDL Cholesterol 101 <130 mg/dL   Basic Metabolic Panel   Result Value Ref Range    Sodium 140 134 - 144 mmol/L    Potassium 4.1 3.5 - 5.1 mmol/L    Chloride 103 98 - 107 mmol/L    Carbon Dioxide 30 21 - 31 mmol/L    Anion Gap 7 3 - 14 mmol/L    Glucose 103 70 - 105 mg/dL    Urea Nitrogen 15 7 - 25 mg/dL    Creatinine 1.04 0.70 - 1.30 mg/dL    GFR Estimate 70 >60 mL/min/[1.73_m2]    GFR Estimate If Black 85 >60 mL/min/[1.73_m2]    Calcium 9.3 8.6 - 10.3 mg/dL       If you have any questions or concerns, please call the clinic at the number listed above.       Sincerely,        Tristen Sargent MD

## 2019-10-02 NOTE — NURSING NOTE
Patient presents to the clinic for a medication check.  Medication Reconciliation Completed.    Denys Negro LPN  10/2/2019 8:53 AM

## 2019-10-02 NOTE — LETTER
October 3, 2019      Faustino Diallo  [address] [place] [address]        Dear ,    We are writing to inform you of your test results.    Your test results fall within the expected range(s) or remain unchanged from previous results.  Please continue with current treatment plan.    Resulted Orders   Lipid Panel   Result Value Ref Range    Cholesterol 192 <200 mg/dL    Triglycerides 69 <150 mg/dL    HDL Cholesterol 91 23 - 92 mg/dL    LDL Cholesterol Calculated 87 <100 mg/dL      Comment:      Desirable:       <100 mg/dl    Non HDL Cholesterol 101 <130 mg/dL   Basic Metabolic Panel   Result Value Ref Range    Sodium 140 134 - 144 mmol/L    Potassium 4.1 3.5 - 5.1 mmol/L    Chloride 103 98 - 107 mmol/L    Carbon Dioxide 30 21 - 31 mmol/L    Anion Gap 7 3 - 14 mmol/L    Glucose 103 70 - 105 mg/dL    Urea Nitrogen 15 7 - 25 mg/dL    Creatinine 1.04 0.70 - 1.30 mg/dL    GFR Estimate 70 >60 mL/min/[1.73_m2]    GFR Estimate If Black 85 >60 mL/min/[1.73_m2]    Calcium 9.3 8.6 - 10.3 mg/dL       If you have any questions or concerns, please call the clinic at the number listed above.       Sincerely,        Tristen Sargent MD

## 2019-10-03 ASSESSMENT — ANXIETY QUESTIONNAIRES: GAD7 TOTAL SCORE: 0

## 2020-09-02 ENCOUNTER — OFFICE VISIT (OUTPATIENT)
Dept: FAMILY MEDICINE | Facility: OTHER | Age: 75
End: 2020-09-02
Attending: FAMILY MEDICINE
Payer: MEDICARE

## 2020-09-02 VITALS
BODY MASS INDEX: 25.14 KG/M2 | RESPIRATION RATE: 16 BRPM | DIASTOLIC BLOOD PRESSURE: 88 MMHG | SYSTOLIC BLOOD PRESSURE: 190 MMHG | TEMPERATURE: 97.2 F | HEART RATE: 76 BPM | WEIGHT: 195.8 LBS

## 2020-09-02 DIAGNOSIS — R31.0 GROSS HEMATURIA: ICD-10-CM

## 2020-09-02 DIAGNOSIS — L30.1 DYSHIDROTIC ECZEMA: ICD-10-CM

## 2020-09-02 DIAGNOSIS — I10 ESSENTIAL HYPERTENSION: Primary | ICD-10-CM

## 2020-09-02 LAB
ALBUMIN UR-MCNC: 10 MG/DL
APPEARANCE UR: CLEAR
BILIRUB UR QL STRIP: NEGATIVE
COLOR UR AUTO: YELLOW
GLUCOSE UR STRIP-MCNC: NEGATIVE MG/DL
HGB UR QL STRIP: ABNORMAL
KETONES UR STRIP-MCNC: 5 MG/DL
LEUKOCYTE ESTERASE UR QL STRIP: ABNORMAL
MUCOUS THREADS #/AREA URNS LPF: PRESENT /LPF
NITRATE UR QL: NEGATIVE
PH UR STRIP: 6 PH (ref 5–7)
RBC #/AREA URNS AUTO: >182 /HPF (ref 0–2)
SOURCE: ABNORMAL
SP GR UR STRIP: 1.02 (ref 1–1.03)
UROBILINOGEN UR STRIP-MCNC: NORMAL MG/DL (ref 0–2)
WBC #/AREA URNS AUTO: 0 /HPF (ref 0–5)

## 2020-09-02 PROCEDURE — 99214 OFFICE O/P EST MOD 30 MIN: CPT | Performed by: FAMILY MEDICINE

## 2020-09-02 PROCEDURE — G0463 HOSPITAL OUTPT CLINIC VISIT: HCPCS

## 2020-09-02 PROCEDURE — 81001 URINALYSIS AUTO W/SCOPE: CPT | Mod: ZL | Performed by: FAMILY MEDICINE

## 2020-09-02 PROCEDURE — 87086 URINE CULTURE/COLONY COUNT: CPT | Mod: ZL | Performed by: FAMILY MEDICINE

## 2020-09-02 RX ORDER — TRIAMCINOLONE ACETONIDE 1 MG/G
CREAM TOPICAL 2 TIMES DAILY
Qty: 30 G | Refills: 3 | Status: SHIPPED | OUTPATIENT
Start: 2020-09-02 | End: 2022-01-01

## 2020-09-02 RX ORDER — METOPROLOL TARTRATE 50 MG
50 TABLET ORAL 2 TIMES DAILY
Qty: 180 TABLET | Refills: 3 | Status: SHIPPED | OUTPATIENT
Start: 2020-09-02 | End: 2021-10-04

## 2020-09-02 RX ORDER — LISINOPRIL 20 MG/1
20 TABLET ORAL DAILY
Qty: 90 TABLET | Refills: 3 | Status: SHIPPED | OUTPATIENT
Start: 2020-09-02 | End: 2021-10-04

## 2020-09-02 RX ORDER — SULFAMETHOXAZOLE/TRIMETHOPRIM 800-160 MG
1 TABLET ORAL 2 TIMES DAILY
Qty: 14 TABLET | Refills: 0 | Status: SHIPPED | OUTPATIENT
Start: 2020-09-02 | End: 2020-09-09

## 2020-09-02 RX ORDER — HYDROCHLOROTHIAZIDE 25 MG/1
25 TABLET ORAL DAILY
Qty: 90 TABLET | Refills: 3 | Status: SHIPPED | OUTPATIENT
Start: 2020-09-02 | End: 2021-08-18

## 2020-09-02 ASSESSMENT — ENCOUNTER SYMPTOMS
BACK PAIN: 0
FEVER: 0
FREQUENCY: 0
DIFFICULTY URINATING: 0
CHILLS: 0
FLANK PAIN: 0
RESPIRATORY NEGATIVE: 1
HEMATURIA: 1
PSYCHIATRIC NEGATIVE: 1
EYES NEGATIVE: 1
DYSURIA: 0
DIZZINESS: 0

## 2020-09-02 ASSESSMENT — PAIN SCALES - GENERAL: PAINLEVEL: NO PAIN (0)

## 2020-09-02 NOTE — NURSING NOTE
Patient here for blood in urine for the past 1 week. He noticed yesterday that the urine stream was red. No pain.He does need refills on his medications. Medication Reconciliation: complete.    Inés Lopez LPN  9/2/2020 10:16 AM

## 2020-09-02 NOTE — PROGRESS NOTES
SUBJECTIVE:   Faustino Diallo is a 74 year old male who presents to clinic today for the following health issues: Hand peeling  Blood in the urine    Patient arrives here for hand peeling and blood in the urine.  His hands peel on and off over the years.  Seem to get better and worse.  They blister up sometimes especially.  This is a good time.  He is used over-the-counter lotions but nothing prescription.  Also about 1 week ago get some light red blood.  Doubts darker blood.  He occasionally is passing small clots.  He denies any fevers or chills.  He reports a history of kidney stones in the past.  States he about 5 years ago had lithotripsy.  And stenting.  He is not had any problems since seeing urology in [place].        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  Patient declined.      Past Surgical History:   Procedure Laterality Date     CYSTOSCOPY      cystoscopy with IV sedation     EXTRACORPOREAL SHOCK WAVE LITHOTRIPSY (ESWL)      No Comments Provided     OTHER SURGICAL HISTORY      ,ABDOMINAL AORTIC ANEURYSM REPAIR,endovascular with right renal     Current Outpatient  Medications   Medication Sig Dispense Refill     aspirin EC 81 MG EC tablet Take 81 mg by mouth daily with food       lisinopril (ZESTRIL) 20 MG tablet Take 1 tablet (20 mg) by mouth daily 90 tablet 3     metoprolol tartrate (LOPRESSOR) 50 MG tablet Take 1 tablet (50 mg) by mouth 2 times daily 180 tablet 3     Multiple Vitamins-Minerals (CENTRUM SILVER 50+MEN PO) Take 1 tablet by mouth daily       sulfamethoxazole-trimethoprim (BACTRIM DS) 800-160 MG tablet Take 1 tablet by mouth 2 times daily for 7 days 14 tablet 0     triamcinolone (KENALOG) 0.1 % external cream Apply topically 2 times daily 30 g 3     Allergies   Allergen Reactions     Seasonal Allergies Other (See Comments)     Seasonal allergie       Review of Systems   Constitutional: Negative for chills and fever.   Eyes: Negative.    Respiratory: Negative.    Genitourinary: Positive for hematuria. Negative for difficulty urinating, dysuria, flank pain, frequency and urgency.   Musculoskeletal: Negative for back pain.   Neurological: Negative for dizziness.   Psychiatric/Behavioral: Negative.         OBJECTIVE:     BP (!) 190/88   Pulse 76   Temp 97.2  F (36.2  C)   Resp 16   Wt 88.8 kg (195 lb 12.8 oz)   BMI 25.14 kg/m    Body mass index is 25.14 kg/m .  Physical Exam  Constitutional:       Appearance: Normal appearance.   HENT:      Nose: Nose normal.   Cardiovascular:      Rate and Rhythm: Normal rate and regular rhythm.   Pulmonary:      Effort: Pulmonary effort is normal.      Breath sounds: Normal breath sounds.   Abdominal:      General: Abdomen is flat.   Musculoskeletal:      Comments: No CVA tenderness on percussion   Skin:     Comments: And shows small vesicles.  [place] in different stages.   Neurological:      Mental Status: He is alert.   Psychiatric:         Mood and Affect: Mood normal.         Diagnostic Test Results:  Results for orders placed or performed in visit on 09/02/20 (from the past 24 hour(s))   UA reflex to Microscopic and  Culture    Specimen: Midstream Urine   Result Value Ref Range    Color Urine Yellow     Appearance Urine Clear     Glucose Urine Negative NEG^Negative mg/dL    Bilirubin Urine Negative NEG^Negative    Ketones Urine 5 (A) NEG^Negative mg/dL    Specific Gravity Urine 1.018 1.003 - 1.035    Blood Urine Large (A) NEG^Negative    pH Urine 6.0 5.0 - 7.0 pH    Protein Albumin Urine 10 (A) NEG^Negative mg/dL    Urobilinogen mg/dL Normal 0.0 - 2.0 mg/dL    Nitrite Urine Negative NEG^Negative    Leukocyte Esterase Urine Trace (A) NEG^Negative    Source Midstream Urine     RBC Urine >182 (H) 0 - 2 /HPF    WBC Urine 0 0 - 5 /HPF    Mucous Urine Present (A) NEG^Negative /LPF       ASSESSMENT/PLAN:         1. Hematuria  We will culture the urine.  - UA reflex to Microscopic and Culture  - Urine Culture Aerobic Bacterial    2. Essential hypertension  Medications refilled.  Will add hydrochlorothiazide for his blood pressure.  - metoprolol tartrate (LOPRESSOR) 50 MG tablet; Take 1 tablet (50 mg) by mouth 2 times daily  Dispense: 180 tablet; Refill: 3  - lisinopril (ZESTRIL) 20 MG tablet; Take 1 tablet (20 mg) by mouth daily  Dispense: 90 tablet; Refill: 3    3. Dyshidrotic eczema  Start triamcinolone cream*  - triamcinolone (KENALOG) 0.1 % external cream; Apply topically 2 times daily  Dispense: 30 g; Refill: 3    4. Gross hematuria  Urology referral.  Start Bactrim for possible UTI.  Culture urine.  - sulfamethoxazole-trimethoprim (BACTRIM DS) 800-160 MG tablet; Take 1 tablet by mouth 2 times daily for 7 days  Dispense: 14 tablet; Refill: 0  - UROLOGY ADULT REFERRAL; Future      Tritsen Sargent MD  [facility]

## 2020-09-03 ENCOUNTER — DOCUMENTATION ONLY (OUTPATIENT)
Dept: OTHER | Facility: CLINIC | Age: 75
End: 2020-09-03

## 2020-09-04 LAB
BACTERIA SPEC CULT: NORMAL
SPECIMEN SOURCE: NORMAL

## 2020-09-10 ENCOUNTER — OFFICE VISIT (OUTPATIENT)
Dept: UROLOGY | Facility: OTHER | Age: 75
End: 2020-09-10
Attending: FAMILY MEDICINE
Payer: MEDICARE

## 2020-09-10 VITALS
HEART RATE: 89 BPM | BODY MASS INDEX: 24.7 KG/M2 | WEIGHT: 192.4 LBS | DIASTOLIC BLOOD PRESSURE: 92 MMHG | RESPIRATION RATE: 16 BRPM | SYSTOLIC BLOOD PRESSURE: 150 MMHG

## 2020-09-10 DIAGNOSIS — R31.0 GROSS HEMATURIA: Primary | ICD-10-CM

## 2020-09-10 DIAGNOSIS — R31.0 GROSS HEMATURIA: ICD-10-CM

## 2020-09-10 PROCEDURE — 51798 US URINE CAPACITY MEASURE: CPT | Performed by: UROLOGY

## 2020-09-10 PROCEDURE — 99204 OFFICE O/P NEW MOD 45 MIN: CPT | Performed by: UROLOGY

## 2020-09-10 PROCEDURE — G0463 HOSPITAL OUTPT CLINIC VISIT: HCPCS | Mod: 25

## 2020-09-10 ASSESSMENT — PAIN SCALES - GENERAL: PAINLEVEL: NO PAIN (0)

## 2020-09-10 NOTE — NURSING NOTE
Pt presents to clinic for gross hematuria consult    Review of Systems:    Weight loss:    No     Recent fever/chills:  No   Night sweats:   No  Current skin rash:  Yes   Recent hair loss:  No  Heat intolerance:  No   Cold intolerance:  No  Chest pain:   No   Palpitations:   No  Shortness of breath:  No   Wheezing:   No  Constipation:    No   Diarrhea:   No   Nausea:   No   Vomiting:   No   Kidney/side pain:  No   Back pain:   No  Frequent headaches:  No   Dizziness:     No  Leg swelling:   No   Calf pain:    No    Parents, brothers or sisters with history of kidney cancer:   No  Parents, brothers or sisters with history of bladder cancer: No  Father or brother with history of prostate cancer:  No    Post-Void Residual  A post-void residual was measured by ultrasonic bladder scanner.  5 mL

## 2020-09-10 NOTE — PROGRESS NOTES
Type of Visit  NPV    Chief Complaint  Hematuria, gross    HPI  Mr. Diallo is a 74 year old male with history of kidney stones who presents with gross hematuria.  Gross hematuria started 1 week ago.  Episode lasted about 24 hours and then resolved spontaneously.  Patient denies associated dysuria at the time of onset.  Patient denies clots associated with hematuria.    Patient does have a history of stones as he has undergone shockwave lithotripsy in the remote past.  He has not experienced any stone issues in the last 8 years.    Hematuria-related signs/symptoms  History of smoking?    Yes  History of chemotherapy?   No  History of pelvic radiation?   No  History of kidney or bladder stones?  Yes  History of frequent urinary tract infections? No      Past Medical History  He  has a past medical history of Procedure not carried out because of patient's decision.  Patient Active Problem List   Diagnosis     Abdominal aortic aneurysm (H)     Colonoscopy refused     Elevated PSA     Endoleak of aortic graft (H)     Essential hypertension     Cardiac conduction disorder     Calculus of kidney     Other age-related cataract     Dyshidrotic eczema     Past Surgical History  He  has a past surgical history that includes Cystoscopy; Extracorporeal shock wave lithotripsy (ESWL); and other surgical history.    Medications  He has a current medication list which includes the following prescription(s): aspirin, hydrochlorothiazide, lisinopril, metoprolol tartrate, multiple vitamins-minerals, and triamcinolone.    Allergies  Allergies   Allergen Reactions     Seasonal Allergies Other (See Comments)     Seasonal allergie       Social History  He  reports that he has been smoking. He has a 22.50 pack-year smoking history. He has never used smokeless tobacco. He reports current alcohol use of about 5.8 standard drinks of alcohol per week. He reports that he does not use drugs.  No drug abuse.    Family History  Family History    Problem Relation Age of Onset     Other - See Comments Father         dementia/Stroke     Other - See Comments Mother         Parkinson's disease/ dementia       Review of Systems  I personally reviewed the ROS with the patient.    Nursing Notes:   Opal Cam LPN  9/10/2020  9:26 AM  Signed  Pt presents to clinic for gross hematuria consult    Review of Systems:    Weight loss:    No     Recent fever/chills:  No   Night sweats:   No  Current skin rash:  Yes   Recent hair loss:  No  Heat intolerance:  No   Cold intolerance:  No  Chest pain:   No   Palpitations:   No  Shortness of breath:  No   Wheezing:   No  Constipation:    No   Diarrhea:   No   Nausea:   No   Vomiting:   No   Kidney/side pain:  No   Back pain:   No  Frequent headaches:  No   Dizziness:     No  Leg swelling:   No   Calf pain:    No    Parents, brothers or sisters with history of kidney cancer:   No  Parents, brothers or sisters with history of bladder cancer: No  Father or brother with history of prostate cancer:  No    Post-Void Residual  A post-void residual was measured by ultrasonic bladder scanner.  5 mL        Physical Exam  Vitals:    09/10/20 0916   BP: (!) 150/92   BP Location: Right arm   Patient Position: Sitting   Cuff Size: Adult Regular   Pulse: 89   Resp: 16   Weight: 87.3 kg (192 lb 6.4 oz)     Constitutional: No acute distress.  Alert and cooperative   Head: NCAT  Eyes: Conjunctivae normal  Cardiovascular: Regular rate.  Pulmonary/Chest: Respirations are even and non-labored bilaterally, no audible wheezing  Abdominal: Soft. No distension, tenderness, masses or guarding.   Neurological: A + O x 3.  Cranial Nerves II-XII grossly intact.  Extremities: DENIS x 4, Warm. No clubbing.  No cyanosis.    Skin: Pink, warm and dry.  No visible rashes noted.  Psychiatric:  Normal mood and affect  Back:  No left CVA tenderness.  No right CVA tenderness.  Genitourinary:  Nonpalpable bladder    Labs  Results for JAY JAY BARRIOS ETHEL (MRN  [id number]) as of 9/10/2020 09:27   9/2/2020 10:41   Color Urine Yellow   Appearance Urine Clear   Glucose Urine Negative   Bilirubin Urine Negative   Ketones Urine 5 (A)   Specific Gravity Urine 1.018   pH Urine 6.0   Protein Albumin Urine 10 (A)   Urobilinogen mg/dL Normal   Nitrite Urine Negative   Blood Urine Large (A)   Leukocyte Esterase Urine Trace (A)   Source Midstream Urine   WBC Urine 0   RBC Urine >182 (H)   Mucous Urine Present (A)   Specimen Description Midstream Urine   Culture Micro Urine culture negative (no growth of uropathogens).     Results for JAY JAY BARRIOS (MRN [id number]) as of 9/10/2020 09:27   5/22/2015 12:17   PSA 4.139 (H)     Post-Void Residual  A post-void residual was measured by ultrasonic bladder scanner.  5 mL    Assessment  Mr. Barrios is a 74 year old male with sterile gross hematuria    Discussed rationale for work up.  Discussed potential findings of hematuria work up including, but not limited to, kidney stones, bladder tumors and/or kidney tumors.  Also discussed the potential for a normal work up.    Regarding stones he had multiple questions regarding stone prevention.    We also discussed PSA screening (risks and benefits) and the patient elected not to pursue  additional PSA testing.    Plan  CT Urogram with follow up for cystoscopy

## 2020-09-17 ENCOUNTER — HOSPITAL ENCOUNTER (OUTPATIENT)
Dept: CT IMAGING | Facility: OTHER | Age: 75
End: 2020-09-17
Attending: UROLOGY
Payer: MEDICARE

## 2020-09-17 ENCOUNTER — OFFICE VISIT (OUTPATIENT)
Dept: UROLOGY | Facility: OTHER | Age: 75
End: 2020-09-17
Attending: UROLOGY
Payer: MEDICARE

## 2020-09-17 VITALS — RESPIRATION RATE: 12 BRPM | BODY MASS INDEX: 25.11 KG/M2 | WEIGHT: 195.6 LBS | HEART RATE: 92 BPM

## 2020-09-17 DIAGNOSIS — R31.0 GROSS HEMATURIA: ICD-10-CM

## 2020-09-17 DIAGNOSIS — R31.0 GROSS HEMATURIA: Primary | ICD-10-CM

## 2020-09-17 LAB
CREAT SERPL-MCNC: 1.38 MG/DL (ref 0.7–1.3)
GFR SERPL CREATININE-BSD FRML MDRD: 51 ML/MIN/{1.73_M2}

## 2020-09-17 PROCEDURE — 36415 COLL VENOUS BLD VENIPUNCTURE: CPT | Mod: ZL | Performed by: UROLOGY

## 2020-09-17 PROCEDURE — 52000 CYSTOURETHROSCOPY: CPT | Performed by: UROLOGY

## 2020-09-17 PROCEDURE — 82565 ASSAY OF CREATININE: CPT | Mod: ZL | Performed by: UROLOGY

## 2020-09-17 PROCEDURE — 25500064 ZZH RX 255 OP 636: Performed by: UROLOGY

## 2020-09-17 PROCEDURE — G0463 HOSPITAL OUTPT CLINIC VISIT: HCPCS | Mod: 25

## 2020-09-17 PROCEDURE — 74178 CT ABD&PLV WO CNTR FLWD CNTR: CPT

## 2020-09-17 RX ORDER — IODIXANOL 320 MG/ML
100 INJECTION, SOLUTION INTRAVASCULAR ONCE
Status: COMPLETED | OUTPATIENT
Start: 2020-09-17 | End: 2020-09-17

## 2020-09-17 RX ADMIN — IODIXANOL 100 ML: 320 INJECTION, SOLUTION INTRAVASCULAR at 10:26

## 2020-09-17 ASSESSMENT — PAIN SCALES - GENERAL: PAINLEVEL: NO PAIN (0)

## 2020-09-17 NOTE — RESULT ENCOUNTER NOTE
Please let patient know that the CT was negative for urologic concerns as we had discussed.    There was some concern, however, that the aortic graft may be leaking some blood but it would need to be compared to his past imaging to assess if this is a new finding or not.  Basically he should to follow-up with his endovascular physician.

## 2020-09-17 NOTE — PATIENT INSTRUCTIONS

## 2020-09-17 NOTE — PROGRESS NOTES
Preprocedure diagnosis  Hematuria    Postprocedure diagnosis  Hematuria    Procedure  Flexible Cystourethroscopy    Surgeon  Tor Strickland MD    Anesthesia  2% lidocaine jelly intraurethrally    Complications  None    Indications  74 year old male undergoing a flexible cystoscopy for the above mentioned indications.    Findings  Cystoscopic findings included a normal anterior urethra.    There was a prominent median lobe.    The lateral lobes were moderately obstructive in appearance.  The bladder appeared to be normal capacity.    There were no tumors, stones or foreign bodies.    The orifices were slit-shaped and in their normal location.    Procedure  The patient was placed in supine position and prepped and draped in sterile fashion with lidocaine jelly per urethra for anesthesia.    I passed a lubricated 14F flexible cystoscope through the penile urethra and into the bladder and the bladder was completely visualized.  The cystoscope was retroflexed and the bladder neck and prostate visualized.    The cystoscope was slowly withdrawn while visualizing the urethra and the procedure terminated.    The patient tolerated the procedure well.      Plan  Reassurance

## 2020-09-17 NOTE — NURSING NOTE
Patient positioned in supine position, perineum area prepped with chlorhexidene Gluconate and patient draped per sterile technique. Per verbal order read back by Tor Strcikland MD, Urojet 10mL 2% lidocaine jelly to be instilled into urethra.  Urojet- 10ml 2% Lidocaine jelly instilled into the urethra.    Urojet 2%  Lot#: [id number]  Expiration date: 5/22  : [employer]  NDC: [address]-1    [place] Protocol    A. Pre-procedure verification complete Yes  1-relevant information / documentation available, reviewed and properly matched to the patient; 2-consent accurate and complete, 3-equipment and supplies available    B. Site marking complete N/A  Site marked if not in continuous attendance with patient    C. TIME OUT completed Yes  Time Out was conducted just prior to starting procedure to verify the eight required elements: 1-patient identity, 2-consent accurate and complete, 3-position, 4-correct side/site marked (if applicable), 5-procedure, 6-relevant images / results properly labeled and displayed (if applicable), 7-antibiotics / irrigation fluids (if applicable), 8-safety precautions.    After procedure perineum area rinsed. Discharge instructions reviewed with patient. Patient verbalized understanding of discharge instructions and discharged ambulatory.  Marcia Lara RN..................9/17/2020  11:12 AM

## 2020-09-22 ENCOUNTER — TELEPHONE (OUTPATIENT)
Dept: UROLOGY | Facility: OTHER | Age: 75
End: 2020-09-22

## 2020-09-22 NOTE — TELEPHONE ENCOUNTER
"Patient is making an appointment to see his endovascular surgeon.  They are requesting that the CT images be sent to them.  Patient has left a voice message with [facility]'s JOAO to do this.  He was wanting further clarification on the urgency of this follow up.  Patient was read what the radiologist report said \"Arterial phase endoleak following abdominal aortic aneurysm repair.  Potentially pressurized and growing excluded sac currently spanning  8.8 x 8.6 cm. Comparison with more recent prior CT studies would be  helpful. Recommend endovascular follow-up.\" and what Tor Strickland MD said Please let patient know that the CT was negative for urologic concerns as we had discussed.       There was some concern, however, that the aortic graft may be leaking some blood but it would need to be compared to his past imaging to assess if this is a new finding or not.   Basically he should to follow-up with his endovascular physician.\"    This answers his question.  No further questions.  Marcia Lara RN......September 22, 2020...10:59 AM   "

## 2020-09-22 NOTE — TELEPHONE ENCOUNTER
Patient called and is wondering how urgent the leakage seen on his imaging is. Is it very urgent or non urgent.     Joycelyn Gauthier on 9/22/2020 at 10:44 AM

## 2020-10-14 ENCOUNTER — TRANSFERRED RECORDS (OUTPATIENT)
Dept: HEALTH INFORMATION MANAGEMENT | Facility: OTHER | Age: 75
End: 2020-10-14

## 2020-11-27 ENCOUNTER — OFFICE VISIT (OUTPATIENT)
Dept: FAMILY MEDICINE | Facility: OTHER | Age: 75
End: 2020-11-27
Attending: PHYSICIAN ASSISTANT
Payer: MEDICARE

## 2020-11-27 DIAGNOSIS — I45.9 CARDIAC CONDUCTION DISORDER: ICD-10-CM

## 2020-11-27 DIAGNOSIS — I71.40 ABDOMINAL AORTIC ANEURYSM (AAA) WITHOUT RUPTURE (H): ICD-10-CM

## 2020-11-27 DIAGNOSIS — I10 ESSENTIAL HYPERTENSION: ICD-10-CM

## 2020-11-27 DIAGNOSIS — Z01.818 PREOP GENERAL PHYSICAL EXAM: Primary | ICD-10-CM

## 2020-11-27 LAB
ANION GAP SERPL CALCULATED.3IONS-SCNC: 9 MMOL/L (ref 3–14)
BUN SERPL-MCNC: 23 MG/DL (ref 7–25)
CALCIUM SERPL-MCNC: 9.8 MG/DL (ref 8.6–10.3)
CHLORIDE SERPL-SCNC: 94 MMOL/L (ref 98–107)
CO2 SERPL-SCNC: 28 MMOL/L (ref 21–31)
CREAT SERPL-MCNC: 1.56 MG/DL (ref 0.7–1.3)
GFR SERPL CREATININE-BSD FRML MDRD: 44 ML/MIN/{1.73_M2}
GLUCOSE SERPL-MCNC: 108 MG/DL (ref 70–105)
HGB BLD-MCNC: 15.8 G/DL (ref 13.3–17.7)
INTERPRETATION ECG - MUSE: NORMAL
POTASSIUM SERPL-SCNC: 3.9 MMOL/L (ref 3.5–5.1)
SODIUM SERPL-SCNC: 131 MMOL/L (ref 134–144)

## 2020-11-27 PROCEDURE — 80048 BASIC METABOLIC PNL TOTAL CA: CPT | Mod: ZL | Performed by: PHYSICIAN ASSISTANT

## 2020-11-27 PROCEDURE — 36415 COLL VENOUS BLD VENIPUNCTURE: CPT | Mod: ZL | Performed by: PHYSICIAN ASSISTANT

## 2020-11-27 PROCEDURE — 93010 ELECTROCARDIOGRAM REPORT: CPT | Performed by: INTERNAL MEDICINE

## 2020-11-27 PROCEDURE — 85018 HEMOGLOBIN: CPT | Mod: ZL | Performed by: PHYSICIAN ASSISTANT

## 2020-11-27 PROCEDURE — G0463 HOSPITAL OUTPT CLINIC VISIT: HCPCS

## 2020-11-27 PROCEDURE — 93005 ELECTROCARDIOGRAM TRACING: CPT

## 2020-11-27 PROCEDURE — 99214 OFFICE O/P EST MOD 30 MIN: CPT | Performed by: PHYSICIAN ASSISTANT

## 2020-11-27 NOTE — PATIENT INSTRUCTIONS

## 2020-11-27 NOTE — PROGRESS NOTES
----------------- PREOPERATIVE EXAM ------------------  11/27/2020    SUBJECTIVE:  Faustino Diallo is a 75 year old male here for preoperative optimization.    I was asked to see Faustino Diallo by Dr. Torres for preoperative optimization due to abdominal aortic aneurysm.    Date of Surgery: 11/30/2020  Type of Surgery: Modified aortic multibranch endoprosthesis placement   Surgeon: Chirag Torres MD   [facility]   NPI: [id number]   [address] [place], [place] [id number]    HPI: History of aortoiliac aneurysmal disease.  Underwent modular bifurcated endovascular aneurysm repair in 2013.  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    Recently went underwent evaluation for hematuria with abdominal CT which demonstrated significant growth of the aneurysm.  Aneurysm now measures 8.8 cm.    Fever/Chills or other infectious symptoms in past month: No  >10lb weight loss in past two months: No    Patient Active Problem List    Diagnosis Date Noted     Dyshidrotic eczema 09/02/2020     Priority: Medium     Other age-related cataract 08/02/2018     Priority: Medium     Essential hypertension 01/25/2018     Priority: Medium     Cardiac conduction disorder 01/25/2018     Priority: Medium     Endoleak of aortic graft (H) 08/02/2017     Priority: Medium     Elevated PSA 05/27/2015     Priority: Medium     Overview:   Declines urology consult  05/2015       Colonoscopy refused 03/19/2013     Priority: Medium     Overview:   2013 2015       Abdominal aortic aneurysm (H) 01/02/2012     Priority: Medium     Overview:   Endovascular repair 4/13 with renal artery stenting on right       Calculus of kidney 12/01/2010     Priority: Medium       Past Medical History:   Diagnosis Date     Procedure not carried out because of patient's decision     01/02/12,  Discussed colon screening exam.  Patient declined.       Past Surgical History:   Procedure  Laterality Date     CYSTOSCOPY      cystoscopy with IV sedation     EXTRACORPOREAL SHOCK WAVE LITHOTRIPSY (ESWL)      No Comments Provided     OTHER SURGICAL HISTORY      ,ABDOMINAL AORTIC ANEURYSM REPAIR,endovascular with right renal       Family History   Problem Relation Age of Onset     Other - See Comments Father         dementia/Stroke     Other - See Comments Mother         Parkinson's disease/ dementia       Social History     Tobacco Use     Smoking status: Current Every Day Smoker     Packs/day: 0.50     Years: 45.00     Pack years: 22.50     Smokeless tobacco: Never Used     Tobacco comment: Quit smoking: enc. to stop   Substance Use Topics     Alcohol use: Yes     Alcohol/week: 5.8 standard drinks     Comment: 3 a day      Drug use: No     Types: Other     Comment: Drug use: No       Current Outpatient Medications   Medication Sig Dispense Refill     aspirin EC 81 MG EC tablet Take 81 mg by mouth daily with food       hydrochlorothiazide (HYDRODIURIL) 25 MG tablet Take 1 tablet (25 mg) by mouth daily 90 tablet 3     lisinopril (ZESTRIL) 20 MG tablet Take 1 tablet (20 mg) by mouth daily 90 tablet 3     metoprolol tartrate (LOPRESSOR) 50 MG tablet Take 1 tablet (50 mg) by mouth 2 times daily 180 tablet 3     Multiple Vitamins-Minerals (CENTRUM SILVER 50+MEN PO) Take 1 tablet by mouth daily       triamcinolone (KENALOG) 0.1 % external cream Apply topically 2 times daily 30 g 3       Allergies:  Allergies   Allergen Reactions     Seasonal Allergies Other (See Comments)     Seasonal allergies, rhinitis       ROS:    Surgical:  patient denies previous complications from prior surgeries including but not limited to prolonged bleeding, anesthesia complications, dysrhythmias, surgical wound infections, or prolonged hospital stay.    Denies family hx of bleeding tendencies, anesthesia complications, or other problems with surgery.    Review Of Systems  Skin: negative for, rash, bruising  Eyes:  negative  Ears/Nose/Throat: negative for, nasal congestion, deafness, tinnitus, vertigo, sinus trouble, persistent sore throat  Respiratory: No shortness of breath, dyspnea on exertion, cough, or hemoptysis  Cardiovascular: negative for, palpitations, tachycardia, chest pain, paroxysmal nocturnal dyspnea and dyspnea on exertion  Gastrointestinal: negative for, nausea, vomiting, constipation and diarrhea  Genitourinary: negative for, dysuria, frequency, urgency and hematuria  Musculoskeletal: negative  Neurologic: negative for, headaches, syncope and seizures  Psychiatric: negative for, anxiety, depression, thoughts of self-harm, thoughts of hurting someone else, agitation and hallucinations  Hematologic/Lymphatic/Immunologic: negative  Endocrine: negative     -------------------------------------------------------------    PHYSICAL EXAM:  There were no vitals taken for this visit.    EXAM:  General Appearance: Pleasant, alert, appropriate appearance for age. No acute distress  Head Exam: Normal. Normocephalic, atraumatic.  Eyes: PERRL, EOMI  Ears: Normal TM's bilaterally. Normal auditory canals and external ears.   OroPharynx: Normal buccal mucosa. Normal pharynx.  Neck: Supple, no masses or nodes, no lymphadenopathy.  No thyromegaly.  Lungs: Normal chest wall and respirations. Clear to auscultation, no wheezes or crackles.  Cardiovascular: Regular rate and rhythm. S1, S2, no murmurs.  Gastrointestinal: Soft, nontender, no abnormal masses or organomegaly. BS normal   Musculoskeletal: No edema.  Skin: no concerning or new rashes.  Neurologic Exam: CN 2-12 grossly intact.  Normal gait.  Symmetric DTRs, normal gross motor movement, tone, and coordination. No tremor.  Psychiatric Exam: Alert and oriented, appropriate affect.    EKG: Personally reviewed and shows:  Normal sinus rhythm  Ventricular rate 87 bpm  UT interval 174 ms  QRS duration 90 ms QT/QTc 362/435 ms  PRT axis 70 9 64    Results for orders placed or  performed in visit on 11/27/20   EKG 12-lead complete w/read - Clinics     Status: None (Preliminary result)   Result Value Ref Range    Interpretation ECG Click View Image link to view waveform and result      Previous EKG 8/11/2017 personally reviewed and shows:  Normal sinus rhythm with left axis deviation, normal VT and QRS indices.    Left axis deviation was new compared to 4/23/2014.      ---------------------------------------------------------------  LABS  Results for orders placed or performed in visit on 11/27/20   EKG 12-lead complete w/read - Clinics     Status: None (Preliminary result)   Result Value Ref Range    Interpretation ECG Click View Image link to view waveform and result        ASSESSEMENT AND PLAN:    Faustino was seen today for pre-op exam.    Diagnoses and all orders for this visit:    Preop general physical exam  -     EKG 12-lead complete w/read - Clinics  -     Basic Metabolic Panel; Future  -     Hemoglobin; Future    Abdominal aortic aneurysm (AAA) without rupture (H)    Essential hypertension    Cardiac conduction disorder        Last EKG was 2017 and showed NSR with left axis deviation    Updated EKG today     BMP and hemoglobin pending    Patient is medically optimized and approved for the above-stated procedure.     PRE OP RECOMMENDATIONS:  Patient is on chronic pain medications no  Patient is on antiplatlet/anticoagulation medication no  Other medications that need adjustment perioperatively no    Other:  Patient was advised to call our office and the surgical services with any change in condition or new symptoms if they were to develop between today and their surgical date.  Especially any cardiopulmonary symptoms or symptoms concerning for an infection.    MELBA Villegas 11/27/2020

## 2020-11-27 NOTE — NURSING NOTE
"Chief Complaint   Patient presents with     Pre-Op Exam     11/30/2020-Dr. Carpenter - [facility] -AAA stent adjustment     Fax number is [phone].   Initial There were no vitals taken for this visit. Estimated body mass index is 25.11 kg/m  as calculated from the following:    Height as of 10/2/19: 1.88 m (6' 2\").    Weight as of 9/17/20: 88.7 kg (195 lb 9.6 oz).  Medication Reconciliation: complete    Tanvi Camarena LPN  "

## 2020-11-30 ENCOUNTER — TRANSFERRED RECORDS (OUTPATIENT)
Dept: HEALTH INFORMATION MANAGEMENT | Facility: OTHER | Age: 75
End: 2020-11-30

## 2020-12-08 ENCOUNTER — OFFICE VISIT (OUTPATIENT)
Dept: FAMILY MEDICINE | Facility: OTHER | Age: 75
End: 2020-12-08
Attending: FAMILY MEDICINE
Payer: MEDICARE

## 2020-12-08 VITALS
HEART RATE: 114 BPM | OXYGEN SATURATION: 98 % | SYSTOLIC BLOOD PRESSURE: 144 MMHG | DIASTOLIC BLOOD PRESSURE: 54 MMHG | BODY MASS INDEX: 24.5 KG/M2 | TEMPERATURE: 97.3 F | WEIGHT: 190.8 LBS

## 2020-12-08 DIAGNOSIS — K59.01 SLOW TRANSIT CONSTIPATION: ICD-10-CM

## 2020-12-08 DIAGNOSIS — I71.40 ABDOMINAL AORTIC ANEURYSM (AAA) WITHOUT RUPTURE (H): Primary | ICD-10-CM

## 2020-12-08 DIAGNOSIS — R00.0 SINUS TACHYCARDIA: ICD-10-CM

## 2020-12-08 LAB
ANION GAP SERPL CALCULATED.3IONS-SCNC: 14 MMOL/L (ref 3–14)
BASOPHILS # BLD AUTO: 0.1 10E9/L (ref 0–0.2)
BASOPHILS NFR BLD AUTO: 0.4 %
BUN SERPL-MCNC: 22 MG/DL (ref 7–25)
CALCIUM SERPL-MCNC: 9.9 MG/DL (ref 8.6–10.3)
CHLORIDE SERPL-SCNC: 88 MMOL/L (ref 98–107)
CO2 SERPL-SCNC: 26 MMOL/L (ref 21–31)
CREAT SERPL-MCNC: 1.28 MG/DL (ref 0.7–1.3)
DIFFERENTIAL METHOD BLD: ABNORMAL
EOSINOPHIL # BLD AUTO: 0.5 10E9/L (ref 0–0.7)
EOSINOPHIL NFR BLD AUTO: 3.7 %
ERYTHROCYTE [DISTWIDTH] IN BLOOD BY AUTOMATED COUNT: 13 % (ref 10–15)
GFR SERPL CREATININE-BSD FRML MDRD: 55 ML/MIN/{1.73_M2}
GLUCOSE SERPL-MCNC: 122 MG/DL (ref 70–105)
HCT VFR BLD AUTO: 40.3 % (ref 40–53)
HGB BLD-MCNC: 13.9 G/DL (ref 13.3–17.7)
IMM GRANULOCYTES # BLD: 0.1 10E9/L (ref 0–0.4)
IMM GRANULOCYTES NFR BLD: 0.7 %
LYMPHOCYTES # BLD AUTO: 2.3 10E9/L (ref 0.8–5.3)
LYMPHOCYTES NFR BLD AUTO: 19.3 %
MCH RBC QN AUTO: 31.9 PG (ref 26.5–33)
MCHC RBC AUTO-ENTMCNC: 34.5 G/DL (ref 31.5–36.5)
MCV RBC AUTO: 92 FL (ref 78–100)
MONOCYTES # BLD AUTO: 1.1 10E9/L (ref 0–1.3)
MONOCYTES NFR BLD AUTO: 9.5 %
NEUTROPHILS # BLD AUTO: 8 10E9/L (ref 1.6–8.3)
NEUTROPHILS NFR BLD AUTO: 66.4 %
PLATELET # BLD AUTO: 336 10E9/L (ref 150–450)
POTASSIUM SERPL-SCNC: 4.3 MMOL/L (ref 3.5–5.1)
RBC # BLD AUTO: 4.36 10E12/L (ref 4.4–5.9)
SODIUM SERPL-SCNC: 128 MMOL/L (ref 134–144)
WBC # BLD AUTO: 12.1 10E9/L (ref 4–11)

## 2020-12-08 PROCEDURE — G0463 HOSPITAL OUTPT CLINIC VISIT: HCPCS

## 2020-12-08 PROCEDURE — 99214 OFFICE O/P EST MOD 30 MIN: CPT | Performed by: FAMILY MEDICINE

## 2020-12-08 PROCEDURE — 36415 COLL VENOUS BLD VENIPUNCTURE: CPT | Mod: ZL | Performed by: FAMILY MEDICINE

## 2020-12-08 PROCEDURE — 80048 BASIC METABOLIC PNL TOTAL CA: CPT | Mod: ZL | Performed by: FAMILY MEDICINE

## 2020-12-08 PROCEDURE — 85025 COMPLETE CBC W/AUTO DIFF WBC: CPT | Mod: ZL | Performed by: FAMILY MEDICINE

## 2020-12-08 PROCEDURE — 93010 ELECTROCARDIOGRAM REPORT: CPT | Performed by: INTERNAL MEDICINE

## 2020-12-08 RX ORDER — CLOPIDOGREL BISULFATE 75 MG/1
75 TABLET ORAL DAILY
COMMUNITY
Start: 2020-12-01

## 2020-12-08 RX ORDER — HYDROCODONE BITARTRATE AND ACETAMINOPHEN 5; 325 MG/1; MG/1
1 TABLET ORAL EVERY 6 HOURS PRN
COMMUNITY
Start: 2020-12-01 | End: 2021-02-01

## 2020-12-08 ASSESSMENT — PAIN SCALES - GENERAL: PAINLEVEL: NO PAIN (0)

## 2020-12-08 NOTE — NURSING NOTE
Patient here for hospital follow up after AAA repair on 11/30/2020. He is very uncomfortable and sore but he he feels he may be constipated. Medication Reconciliation: complete.    Inés Lopez LPN  12/8/2020 10:36 AM

## 2020-12-08 NOTE — PROGRESS NOTES
SUBJECTIVE:   Faustino Diallo is a 75 year old male who presents to clinic today for the following health issues: Follow-up hospitalization    Patient is seen for follow-up hospitalization.  He was recently seen for a AAA repair.  Patient reports the stenting went without any difficulty.  But since being discharged she has had only 1 bowel movement.  Basically 1 bowel movement last 10 days.  He tried Dulcolax without much improvement.  He reports fatigue tiredness.  Finds he is sleeping in a recliner more.  Has not taken a shower over the last 10 days.  At least since his discharge.  Is also having a little bit of a nasal bleed.  He is on Plavix from the aortic repair.        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  Patient declined.      Past Surgical History:   Procedure Laterality Date     CYSTOSCOPY      cystoscopy with IV sedation     EXTRACORPOREAL SHOCK WAVE LITHOTRIPSY (ESWL)      No Comments Provided     OTHER SURGICAL HISTORY      ,ABDOMINAL AORTIC  ANEURYSM REPAIR,endovascular with right renal     Allergies   Allergen Reactions     Seasonal Allergies Other (See Comments)     Seasonal allergies, rhinitis       Review of Systems     OBJECTIVE:     BP (!) 144/54   Pulse 114   Temp 97.3  F (36.3  C)   Wt 86.5 kg (190 lb 12.8 oz)   SpO2 98%   BMI 24.50 kg/m    Body mass index is 24.5 kg/m .  Physical Exam  HENT:      Head: Normocephalic and atraumatic.      Right Ear: Tympanic membrane normal.   Cardiovascular:      Rate and Rhythm: Tachycardia present.      Comments: Patient has frequently extra early beats  Pulmonary:      Effort: Pulmonary effort is normal.      Breath sounds: Normal breath sounds.   Skin:     Comments: Incisions in the  left upper chest along with the groins appear to be healing well.   Neurological:      Mental Status: He is alert.         Diagnostic Test Results:  Results for orders placed or performed in visit on 12/08/20 (from the past 24 hour(s))   Basic Metabolic Panel   Result Value Ref Range    Sodium 128 (L) 134 - 144 mmol/L    Potassium 4.3 3.5 - 5.1 mmol/L    Chloride 88 (L) 98 - 107 mmol/L    Carbon Dioxide 26 21 - 31 mmol/L    Anion Gap 14 3 - 14 mmol/L    Glucose 122 (H) 70 - 105 mg/dL    Urea Nitrogen 22 7 - 25 mg/dL    Creatinine 1.28 0.70 - 1.30 mg/dL    GFR Estimate 55 (L) >60 mL/min/[1.73_m2]    GFR Estimate If Black 66 >60 mL/min/[1.73_m2]    Calcium 9.9 8.6 - 10.3 mg/dL   CBC and Differential   Result Value Ref Range    WBC 12.1 (H) 4.0 - 11.0 10e9/L    RBC Count 4.36 (L) 4.4 - 5.9 10e12/L    Hemoglobin 13.9 13.3 - 17.7 g/dL    Hematocrit 40.3 40.0 - 53.0 %    MCV 92 78 - 100 fl    MCH 31.9 26.5 - 33.0 pg    MCHC 34.5 31.5 - 36.5 g/dL    RDW 13.0 10.0 - 15.0 %    Platelet Count 336 150 - 450 10e9/L    Diff Method Automated Method     % Neutrophils 66.4 %    % Lymphocytes 19.3 %    % Monocytes 9.5 %    % Eosinophils 3.7 %    % Basophils 0.4 %    % Immature Granulocytes 0.7 %    Absolute Neutrophil 8.0 1.6 - 8.3 10e9/L     Absolute Lymphocytes 2.3 0.8 - 5.3 10e9/L    Absolute Monocytes 1.1 0.0 - 1.3 10e9/L    Absolute Eosinophils 0.5 0.0 - 0.7 10e9/L    Absolute Basophils 0.1 0.0 - 0.2 10e9/L    Abs Immature Granulocytes 0.1 0 - 0.4 10e9/L   EKG 12-lead, tracing only   Result Value Ref Range    Interpretation ECG Click View Image link to view waveform and result        ASSESSMENT/PLAN:         1. Abdominal aortic aneurysm (AAA) without rupture (H)  Repair.  Currently doing well although slow to improve.  Recommended continuing to increase activity.  Tachycardia likely due to surgery.  Labs were unremarkable.- EKG 12-lead, tracing only  - CBC and Differential; Future  - Basic Metabolic Panel; Future  - Basic Metabolic Panel  - CBC and Differential    2. Sinus tachycardia  Shows sinus tachycardia likely due to the surgery and ongoing discomfort.    3. Slow transit constipation  Colace.        Tristen Sargent MD  [facility]

## 2020-12-08 NOTE — LETTER
December 9, 2020      Faustino DAVENPORT Yoel  [address] [place] [address]        Dear ,    We are writing to inform you of your test results.    Your test results fall within the expected range(s) or remain unchanged from previous results.  Please continue with current treatment plan.    Resulted Orders   Basic Metabolic Panel   Result Value Ref Range    Sodium 128 (L) 134 - 144 mmol/L    Potassium 4.3 3.5 - 5.1 mmol/L    Chloride 88 (L) 98 - 107 mmol/L    Carbon Dioxide 26 21 - 31 mmol/L    Anion Gap 14 3 - 14 mmol/L    Glucose 122 (H) 70 - 105 mg/dL    Urea Nitrogen 22 7 - 25 mg/dL    Creatinine 1.28 0.70 - 1.30 mg/dL    GFR Estimate 55 (L) >60 mL/min/[1.73_m2]    GFR Estimate If Black 66 >60 mL/min/[1.73_m2]    Calcium 9.9 8.6 - 10.3 mg/dL   CBC and Differential   Result Value Ref Range    WBC 12.1 (H) 4.0 - 11.0 10e9/L    RBC Count 4.36 (L) 4.4 - 5.9 10e12/L    Hemoglobin 13.9 13.3 - 17.7 g/dL    Hematocrit 40.3 40.0 - 53.0 %    MCV 92 78 - 100 fl    MCH 31.9 26.5 - 33.0 pg    MCHC 34.5 31.5 - 36.5 g/dL    RDW 13.0 10.0 - 15.0 %    Platelet Count 336 150 - 450 10e9/L    Diff Method Automated Method     % Neutrophils 66.4 %    % Lymphocytes 19.3 %    % Monocytes 9.5 %    % Eosinophils 3.7 %    % Basophils 0.4 %    % Immature Granulocytes 0.7 %    Absolute Neutrophil 8.0 1.6 - 8.3 10e9/L    Absolute Lymphocytes 2.3 0.8 - 5.3 10e9/L    Absolute Monocytes 1.1 0.0 - 1.3 10e9/L    Absolute Eosinophils 0.5 0.0 - 0.7 10e9/L    Absolute Basophils 0.1 0.0 - 0.2 10e9/L    Abs Immature Granulocytes 0.1 0 - 0.4 10e9/L       If you have any questions or concerns, please call the clinic at the number listed above.       Sincerely,      Tristen Sargent MD

## 2020-12-11 LAB — INTERPRETATION ECG - MUSE: NORMAL

## 2020-12-14 ENCOUNTER — VIRTUAL VISIT (OUTPATIENT)
Dept: FAMILY MEDICINE | Facility: OTHER | Age: 75
End: 2020-12-14
Attending: FAMILY MEDICINE
Payer: MEDICARE

## 2020-12-14 VITALS — BODY MASS INDEX: 24.39 KG/M2 | TEMPERATURE: 96.6 F | WEIGHT: 190 LBS

## 2020-12-14 DIAGNOSIS — K59.01 SLOW TRANSIT CONSTIPATION: Primary | ICD-10-CM

## 2020-12-14 PROCEDURE — G0463 HOSPITAL OUTPT CLINIC VISIT: HCPCS | Mod: TEL

## 2020-12-14 PROCEDURE — 99442 PR PHYSICIAN TELEPHONE EVALUATION 11-20 MIN: CPT | Mod: 95 | Performed by: FAMILY MEDICINE

## 2020-12-14 RX ORDER — DOCUSATE SODIUM 100 MG/1
200 CAPSULE, LIQUID FILLED ORAL DAILY
COMMUNITY
End: 2021-02-01

## 2020-12-14 ASSESSMENT — PAIN SCALES - GENERAL: PAINLEVEL: NO PAIN (0)

## 2020-12-14 NOTE — NURSING NOTE
Patient scheduled for a phone visit  For constipation for the past 2 weeks. His last BM 11/28/2020, he is taking 2 Colace daily.  Medication Reconciliation: complete.    Inés Lopez LPN  12/14/2020 9:54 AM

## 2020-12-14 NOTE — PROGRESS NOTES
"Faustino Diallo is a 75 year old male who is being evaluated via a billable telephone visit.      The patient has been notified of following:     \"This telephone visit will be conducted via a call between you and your physician/provider. We have found that certain health care needs can be provided without the need for a physical exam.  This service lets us provide the care you need with a short phone conversation.  If a prescription is necessary we can send it directly to your pharmacy.  If lab work is needed we can place an order for that and you can then stop by our lab to have the test done at a later time.    Telephone visits are billed at different rates depending on your insurance coverage. During this emergency period, for some insurers they may be billed the same as an in-person visit.  Please reach out to your insurance provider with any questions.    If during the course of the call the physician/provider feels a telephone visit is not appropriate, you will not be charged for this service.\"    Patient has given verbal consent for Telephone visit?  Yes    What phone number would you like to be contacted at? [phone]    How would you like to obtain your AVS? Mail a copy    Subjective     Faustino Diallo is a 75 year old male who presents via phone visit today for the following health issues:    HPI   Patient calls concerned about constipation.  He just does not feel good.  He is taking 2 Colace today with very little relief.  Yesterday he did have rabbit sized stool.  Very hard.  Patient 2 weeks ago did undergo a URI aortic stenting.             Review of Systems          Objective   Vitals - Patient Reported  Pain Score: No Pain (0)          PSYCH: Alert and oriented times 3; coherent speech, normal   rate and volume, able to articulate logical thoughts, able   to abstract reason, no tangential thoughts, no hallucinations   or delusions  His affect is   RESP: No cough, no audible wheezing, able to talk in full " sentences  Remainder of exam unable to be completed due to telephone visits            Assessment/Plan:    Assessment & Plan     Slow transit constipation  Discussed with patient how aggressive they like to be.  Advised her to change to MiraLAX or magnesium citrate.  Versus mineral oil.  Patient went initially try magnesium citrate and then milk of magnesia.  If no improvement call.                    No follow-ups on file.    Tristen Sargent MD  [facility]    Phone call duration: 12 minutes

## 2021-02-01 ENCOUNTER — OFFICE VISIT (OUTPATIENT)
Dept: FAMILY MEDICINE | Facility: OTHER | Age: 76
End: 2021-02-01
Attending: FAMILY MEDICINE
Payer: MEDICARE

## 2021-02-01 VITALS
SYSTOLIC BLOOD PRESSURE: 158 MMHG | WEIGHT: 188.8 LBS | RESPIRATION RATE: 24 BRPM | BODY MASS INDEX: 24.24 KG/M2 | HEART RATE: 99 BPM | OXYGEN SATURATION: 100 % | TEMPERATURE: 97 F | DIASTOLIC BLOOD PRESSURE: 78 MMHG

## 2021-02-01 DIAGNOSIS — R53.83 OTHER FATIGUE: ICD-10-CM

## 2021-02-01 DIAGNOSIS — I10 ESSENTIAL HYPERTENSION: ICD-10-CM

## 2021-02-01 DIAGNOSIS — R20.0 NUMBNESS IN FEET: ICD-10-CM

## 2021-02-01 DIAGNOSIS — I71.40 ABDOMINAL AORTIC ANEURYSM (AAA) WITHOUT RUPTURE (H): ICD-10-CM

## 2021-02-01 DIAGNOSIS — Z00.00 ROUTINE HISTORY AND PHYSICAL EXAMINATION OF ADULT: Primary | ICD-10-CM

## 2021-02-01 LAB
ALBUMIN SERPL-MCNC: 4.3 G/DL (ref 3.5–5.7)
ALP SERPL-CCNC: 69 U/L (ref 34–104)
ALT SERPL W P-5'-P-CCNC: 20 U/L (ref 7–52)
ANION GAP SERPL CALCULATED.3IONS-SCNC: 11 MMOL/L (ref 3–14)
AST SERPL W P-5'-P-CCNC: 21 U/L (ref 13–39)
BILIRUB SERPL-MCNC: 0.6 MG/DL (ref 0.3–1)
BUN SERPL-MCNC: 21 MG/DL (ref 7–25)
CALCIUM SERPL-MCNC: 9.6 MG/DL (ref 8.6–10.3)
CHLORIDE SERPL-SCNC: 96 MMOL/L (ref 98–107)
CO2 SERPL-SCNC: 26 MMOL/L (ref 21–31)
CREAT SERPL-MCNC: 1.32 MG/DL (ref 0.7–1.3)
GFR SERPL CREATININE-BSD FRML MDRD: 53 ML/MIN/{1.73_M2}
GLUCOSE SERPL-MCNC: 114 MG/DL (ref 70–105)
POTASSIUM SERPL-SCNC: 3.9 MMOL/L (ref 3.5–5.1)
PROT SERPL-MCNC: 7 G/DL (ref 6.4–8.9)
SODIUM SERPL-SCNC: 133 MMOL/L (ref 134–144)
TSH SERPL DL<=0.05 MIU/L-ACNC: 1.64 IU/ML (ref 0.34–5.6)
VIT B12 SERPL-MCNC: 982 PG/ML (ref 180–914)

## 2021-02-01 PROCEDURE — 99397 PER PM REEVAL EST PAT 65+ YR: CPT | Performed by: FAMILY MEDICINE

## 2021-02-01 PROCEDURE — 84443 ASSAY THYROID STIM HORMONE: CPT | Mod: ZL | Performed by: FAMILY MEDICINE

## 2021-02-01 PROCEDURE — 36415 COLL VENOUS BLD VENIPUNCTURE: CPT | Mod: ZL | Performed by: FAMILY MEDICINE

## 2021-02-01 PROCEDURE — 80053 COMPREHEN METABOLIC PANEL: CPT | Mod: ZL | Performed by: FAMILY MEDICINE

## 2021-02-01 PROCEDURE — 82607 VITAMIN B-12: CPT | Mod: ZL | Performed by: FAMILY MEDICINE

## 2021-02-01 RX ORDER — AMLODIPINE BESYLATE 5 MG/1
5 TABLET ORAL DAILY
Qty: 30 TABLET | Refills: 11 | Status: SHIPPED | OUTPATIENT
Start: 2021-02-01 | End: 2022-01-01

## 2021-02-01 SDOH — HEALTH STABILITY: MENTAL HEALTH: HOW OFTEN DO YOU HAVE A DRINK CONTAINING ALCOHOL?: NOT ASKED

## 2021-02-01 SDOH — HEALTH STABILITY: MENTAL HEALTH: HOW MANY STANDARD DRINKS CONTAINING ALCOHOL DO YOU HAVE ON A TYPICAL DAY?: NOT ASKED

## 2021-02-01 SDOH — HEALTH STABILITY: MENTAL HEALTH: HOW OFTEN DO YOU HAVE 6 OR MORE DRINKS ON ONE OCCASION?: NOT ASKED

## 2021-02-01 ASSESSMENT — PAIN SCALES - GENERAL: PAINLEVEL: NO PAIN (0)

## 2021-02-01 NOTE — NURSING NOTE
Patient here for wellness exam but he is more concerned with blood pressure and the sensation of cold feet and ankles after his AAA stent. His blood pressure readings at home are as follows Sunday 172/101 p78 , 174/102 p92, today  149/90 p100,  139/83 p80 and the last one today 160/94 and pulse 126.     Medication Reconciliation: complete.    Inés Lopez LPN  2/1/2021 12:49 PM

## 2021-02-01 NOTE — LETTER
February 2, 2021      Faustino Diallo  [address] [place] [address]        Dear ,    We are writing to inform you of your test results.    Your test results fall within the expected range(s) or remain unchanged from previous results.  Please continue with current treatment plan.    Resulted Orders   Vitamin B12   Result Value Ref Range    Vitamin B12 982 (H) 180 - 914 pg/mL   Comprehensive Metabolic Panel   Result Value Ref Range    Sodium 133 (L) 134 - 144 mmol/L    Potassium 3.9 3.5 - 5.1 mmol/L    Chloride 96 (L) 98 - 107 mmol/L    Carbon Dioxide 26 21 - 31 mmol/L    Anion Gap 11 3 - 14 mmol/L    Glucose 114 (H) 70 - 105 mg/dL    Urea Nitrogen 21 7 - 25 mg/dL    Creatinine 1.32 (H) 0.70 - 1.30 mg/dL    GFR Estimate 53 (L) >60 mL/min/[1.73_m2]    GFR Estimate If Black 64 >60 mL/min/[1.73_m2]    Calcium 9.6 8.6 - 10.3 mg/dL    Bilirubin Total 0.6 0.3 - 1.0 mg/dL    Albumin 4.3 3.5 - 5.7 g/dL    Protein Total 7.0 6.4 - 8.9 g/dL    Alkaline Phosphatase 69 34 - 104 U/L    ALT 20 7 - 52 U/L    AST 21 13 - 39 U/L   TSH Reflex GH   Result Value Ref Range    TSH Reflex 1.64 0.34 - 5.60 IU/mL       If you have any questions or concerns, please call the clinic at the number listed above.       Sincerely,      Tristen Sargent MD

## 2021-02-02 ASSESSMENT — ENCOUNTER SYMPTOMS
PSYCHIATRIC NEGATIVE: 1
RESPIRATORY NEGATIVE: 1
FEVER: 0
EYES NEGATIVE: 1
CHILLS: 0
LIGHT-HEADEDNESS: 0
NUMBNESS: 1
DIZZINESS: 0
PARESTHESIAS: 1

## 2021-02-02 NOTE — PROGRESS NOTES
SUBJECTIVE:   Faustino Diallo is a 75 year old male who presents to clinic today for the following health issues:  Physical the patient does have complaints about his feet going numb.  Concerned about high blood pressure.  Trouble with fatigue.  Patient also has the above concerns.  His biggest one is states it feels like his feet is standing in ice water.  They seem to improve when he is sitting or laying.  But worsen with standing or any kind of movement.  Patient also reports he has trouble getting going.  He is continues with quite a bit of fatigue.  States this is been going on since his aneurysm surgery.  He does bring with him a blood pressure readings at home 149/90 160/94 Sunday he was running higher.        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  Patient declined.      Past Surgical History:   Procedure Laterality Date     CYSTOSCOPY      cystoscopy with IV sedation     EXTRACORPOREAL SHOCK WAVE LITHOTRIPSY (ESWL)      No Comments Provided     OTHER SURGICAL  HISTORY      ,ABDOMINAL AORTIC ANEURYSM REPAIR,endovascular with right renal     Current Outpatient Medications   Medication Sig Dispense Refill     amLODIPine (NORVASC) 5 MG tablet Take 1 tablet (5 mg) by mouth daily 30 tablet 11     aspirin EC 81 MG EC tablet Take 81 mg by mouth daily with food       clopidogrel (PLAVIX) 75 MG tablet Take 75 mg by mouth daily       hydrochlorothiazide (HYDRODIURIL) 25 MG tablet Take 1 tablet (25 mg) by mouth daily 90 tablet 3     lisinopril (ZESTRIL) 20 MG tablet Take 1 tablet (20 mg) by mouth daily 90 tablet 3     metoprolol tartrate (LOPRESSOR) 50 MG tablet Take 1 tablet (50 mg) by mouth 2 times daily 180 tablet 3     Multiple Vitamins-Minerals (CENTRUM SILVER 50+MEN PO) Take 1 tablet by mouth daily       triamcinolone (KENALOG) 0.1 % external cream Apply topically 2 times daily 30 g 3     Allergies   Allergen Reactions     Seasonal Allergies Other (See Comments)     Seasonal allergies, rhinitis       Review of Systems   Constitutional: Negative for chills and fever.   Eyes: Negative.    Respiratory: Negative.    Cardiovascular: Negative for chest pain.   Genitourinary: Negative.    Neurological: Positive for numbness and paresthesias. Negative for dizziness and light-headedness.   Psychiatric/Behavioral: Negative.         OBJECTIVE:     BP (!) 158/78   Pulse 99   Temp 97  F (36.1  C)   Resp 24   Wt 85.6 kg (188 lb 12.8 oz)   SpO2 100%   BMI 24.24 kg/m    Body mass index is 24.24 kg/m .  Physical Exam  Constitutional:       Appearance: Normal appearance.   HENT:      Head: Normocephalic and atraumatic.   Cardiovascular:      Rate and Rhythm: Normal rate.   Pulmonary:      Effort: Pulmonary effort is normal.      Breath sounds: Normal breath sounds.   Abdominal:      General: Abdomen is flat.   Musculoskeletal: Normal range of motion.   Skin:     General: Skin is warm.   Neurological:      Mental Status: He is alert.      Comments: Patient has lost sensitivity to  monofilament testing on the bottom of his feet.  He is able to feel 1 testing on the toe both sides.  But for the most part he is lost all sensation   Psychiatric:         Mood and Affect: Mood normal.         Diagnostic Test Results:  Results for orders placed or performed in visit on 02/01/21   Vitamin B12     Status: Abnormal   Result Value Ref Range    Vitamin B12 982 (H) 180 - 914 pg/mL   Comprehensive Metabolic Panel     Status: Abnormal   Result Value Ref Range    Sodium 133 (L) 134 - 144 mmol/L    Potassium 3.9 3.5 - 5.1 mmol/L    Chloride 96 (L) 98 - 107 mmol/L    Carbon Dioxide 26 21 - 31 mmol/L    Anion Gap 11 3 - 14 mmol/L    Glucose 114 (H) 70 - 105 mg/dL    Urea Nitrogen 21 7 - 25 mg/dL    Creatinine 1.32 (H) 0.70 - 1.30 mg/dL    GFR Estimate 53 (L) >60 mL/min/[1.73_m2]    GFR Estimate If Black 64 >60 mL/min/[1.73_m2]    Calcium 9.6 8.6 - 10.3 mg/dL    Bilirubin Total 0.6 0.3 - 1.0 mg/dL    Albumin 4.3 3.5 - 5.7 g/dL    Protein Total 7.0 6.4 - 8.9 g/dL    Alkaline Phosphatase 69 34 - 104 U/L    ALT 20 7 - 52 U/L    AST 21 13 - 39 U/L   TSH Reflex GH     Status: None   Result Value Ref Range    TSH Reflex 1.64 0.34 - 5.60 IU/mL       ASSESSMENT/PLAN:       1. Routine history and physical examination of adult      2. Numbness in feet  Labs satisfactory.  Discussed likely polyneuropathy.  Discussed medications including gabapentin but patient would like to wait.  - TSH Reflex GH; Future  - Comprehensive Metabolic Panel; Future  - Vitamin B12; Future  - Vitamin B12  - Comprehensive Metabolic Panel  - TSH Reflex GH    3. Other fatigue  Labs currently unremarkable.  - Comprehensive Metabolic Panel; Future  - Vitamin B12; Future  - Vitamin B12  - Comprehensive Metabolic Panel      5. Essential hypertension  Currently not under good control we will add amlodipine to take along with the metoprolol and Zestril.  Add hydrochlorothiazide.  Continue with outpatient blood pressure med checks follow-up in 1 month  here  - amLODIPine (NORVASC) 5 MG tablet; Take 1 tablet (5 mg) by mouth daily  Dispense: 30 tablet; Refill: 11      Tristen Sargent MD  [facility]

## 2021-02-17 ENCOUNTER — OFFICE VISIT (OUTPATIENT)
Dept: FAMILY MEDICINE | Facility: OTHER | Age: 76
End: 2021-02-17
Attending: FAMILY MEDICINE
Payer: MEDICARE

## 2021-02-17 VITALS
OXYGEN SATURATION: 100 % | RESPIRATION RATE: 20 BRPM | TEMPERATURE: 96.6 F | DIASTOLIC BLOOD PRESSURE: 80 MMHG | HEART RATE: 80 BPM | WEIGHT: 192 LBS | SYSTOLIC BLOOD PRESSURE: 138 MMHG | BODY MASS INDEX: 24.65 KG/M2

## 2021-02-17 DIAGNOSIS — K61.2 ABSCESS OF ANAL AND RECTAL REGIONS: Primary | ICD-10-CM

## 2021-02-17 PROCEDURE — 99213 OFFICE O/P EST LOW 20 MIN: CPT | Performed by: FAMILY MEDICINE

## 2021-02-17 PROCEDURE — G0463 HOSPITAL OUTPT CLINIC VISIT: HCPCS | Mod: 25

## 2021-02-17 PROCEDURE — 90662 IIV NO PRSV INCREASED AG IM: CPT

## 2021-02-17 PROCEDURE — G0008 ADMIN INFLUENZA VIRUS VAC: HCPCS

## 2021-02-17 PROCEDURE — G0463 HOSPITAL OUTPT CLINIC VISIT: HCPCS

## 2021-02-17 ASSESSMENT — PAIN SCALES - GENERAL: PAINLEVEL: MILD PAIN (2)

## 2021-02-17 NOTE — PROGRESS NOTES
SUBJECTIVE:   Faustino Diallo is a 75 year old male who presents to clinic today for the following health issues:  Painful lump  Patient arrives here for painful lump.  He has had a lump on his right buttocks near his rectum for couple weeks.  But recently has gotten much worse.  Is noticed some bright red blood present.        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  Patient declined.        Review of Systems     OBJECTIVE:     /80 (BP Location: Right arm)   Pulse 80   Temp 96.6  F (35.9  C)   Resp 20   Wt 87.1 kg (192 lb)   SpO2 100%   BMI 24.65 kg/m    Body mass index is 24.65 kg/m .  Physical Exam  Constitutional:       Appearance: Normal appearance.   Musculoskeletal:      Comments: On the right buttocks near the rectum there is a bleeding lesion.  Consistent with a previous abscess.  I was able to express some pus and a clot.   Neurological:      Mental Status: He is alert.         Diagnostic Test Results:  none     ASSESSMENT/PLAN:         1. Abscess  of anal and rectal regions  This is self draining.  I suspect it is just fine.  Patient did get good relief when it started draining and the clot was expressed.  Follow-up if changes occur        Tristen Sargent MD  [facility] [place]

## 2021-02-17 NOTE — NURSING NOTE
Patient here for rectal pain with bright bleeding small amount with wiping. Medication Reconciliation: complete.    Inés Lopez LPN  2/17/2021 9:59 AM

## 2021-03-17 ENCOUNTER — IMMUNIZATION (OUTPATIENT)
Dept: FAMILY MEDICINE | Facility: OTHER | Age: 76
End: 2021-03-17
Attending: FAMILY MEDICINE
Payer: MEDICARE

## 2021-03-17 PROCEDURE — 91300 PR COVID VAC PFIZER DIL RECON 30 MCG/0.3 ML IM: CPT

## 2021-04-07 ENCOUNTER — IMMUNIZATION (OUTPATIENT)
Dept: FAMILY MEDICINE | Facility: OTHER | Age: 76
End: 2021-04-07
Attending: FAMILY MEDICINE
Payer: MEDICARE

## 2021-04-07 PROCEDURE — 0002A PR COVID VAC PFIZER DIL RECON 30 MCG/0.3 ML IM: CPT

## 2021-04-21 ENCOUNTER — MEDICAL CORRESPONDENCE (OUTPATIENT)
Dept: HEALTH INFORMATION MANAGEMENT | Facility: OTHER | Age: 76
End: 2021-04-21

## 2021-06-12 ENCOUNTER — OFFICE VISIT (OUTPATIENT)
Dept: FAMILY MEDICINE | Facility: OTHER | Age: 76
End: 2021-06-12
Attending: FAMILY MEDICINE
Payer: MEDICARE

## 2021-06-12 VITALS
HEART RATE: 80 BPM | OXYGEN SATURATION: 98 % | DIASTOLIC BLOOD PRESSURE: 50 MMHG | RESPIRATION RATE: 18 BRPM | BODY MASS INDEX: 24.12 KG/M2 | TEMPERATURE: 97.1 F | WEIGHT: 194 LBS | SYSTOLIC BLOOD PRESSURE: 136 MMHG | HEIGHT: 75 IN
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
## 2021-06-12 PROCEDURE — 99213 OFFICE O/P EST LOW 20 MIN: CPT | Performed by: FAMILY MEDICINE

## 2021-06-12 PROCEDURE — G0463 HOSPITAL OUTPT CLINIC VISIT: HCPCS

## 2021-06-12 RX ORDER — DOXYCYCLINE HYCLATE 100 MG
200 TABLET ORAL ONCE
Qty: 2 TABLET | Refills: 0 | Status: SHIPPED | OUTPATIENT
Start: 2021-06-12 | End: 2021-06-12

## 2021-06-12 ASSESSMENT — MIFFLIN-ST. JEOR: SCORE: 1697.67

## 2021-06-12 ASSESSMENT — PAIN SCALES - GENERAL: PAINLEVEL: NO PAIN (0)

## 2021-06-12 NOTE — PROGRESS NOTES
"Nursing Notes:   Mary Choi LPN  6/12/2021 11:05 AM  Signed  Chief Complaint   Patient presents with     Insect Bites     Patient presented to the clinic with a tick bite on his upper abdomen. He found the tick this morning and didn't realize that it was a tick and ripped the back end off. He does not think that he got the whole thing out.    Initial /50 (BP Location: Left arm, Patient Position: Sitting, Cuff Size: Adult Large)   Pulse 80   Temp 97.1  F (36.2  C) (Tympanic)   Resp 18   Ht 1.892 m (6' 2.5\")   Wt 88 kg (194 lb)   SpO2 98%   BMI 24.57 kg/m   Estimated body mass index is 24.57 kg/m  as calculated from the following:    Height as of this encounter: 1.892 m (6' 2.5\").    Weight as of this encounter: 88 kg (194 lb).         Medication Reconciliation: Complete      Mary Choi LPN        S:  Faustino Diallo is a 75 year old male presents for evaluation of tick bite.  When found: this morning on his anterior chest.   Removed: yes, partially   Type/Size: small - brings it in today  Symptoms and concerns: if it's still attached     Allergies   Allergen Reactions     Seasonal Allergies Other (See Comments)     Seasonal allergies, rhinitis     Current Outpatient Medications   Medication     amLODIPine (NORVASC) 5 MG tablet     aspirin EC 81 MG EC tablet     clopidogrel (PLAVIX) 75 MG tablet     doxycycline hyclate (VIBRA-TABS) 100 MG tablet     hydrochlorothiazide (HYDRODIURIL) 25 MG tablet     lisinopril (ZESTRIL) 20 MG tablet     metoprolol tartrate (LOPRESSOR) 50 MG tablet     Multiple Vitamins-Minerals (CENTRUM SILVER 50+MEN PO)     triamcinolone (KENALOG) 0.1 % external cream     No current facility-administered medications for this visit.      Past Medical History:   Diagnosis Date     Procedure not carried out because of patient's decision     01/02/12,  Discussed colon screening exam.  Patient declined.       ROS:  History of AAA repair this winter.  History of gluteal abscess       O: " "/50 (BP Location: Left arm, Patient Position: Sitting, Cuff Size: Adult Large)   Pulse 80   Temp 97.1  F (36.2  C) (Tympanic)   Resp 18   Ht 1.892 m (6' 2.5\")   Wt 88 kg (194 lb)   SpO2 98%   BMI 24.57 kg/m     Exam:  Constitutional: healthy, alert and no distress  Skin: upper abdomen, 2 cm area of erythema with small central area of necrotic tissue, partial tick present      ASSESSMENT :      ICD-10-CM    1. Tick bite, initial encounter  W57.XXXA doxycycline hyclate (VIBRA-TABS) 100 MG tablet        PLAN:  1.  Discussed with patient that remainder of tick does not need to be removed.  However, I do recommend Lyme prophylaxis.  Prescription sent for doxycycline 200 mg times single dose.  Potential medication side effects reviewed.  Recommend antibiotic ointment to right area itself.  Discussed likelihood of serous drainage and bodies removal of the foreign body.    Follow-up if needed      Cathleen Downs MD    "

## 2021-06-12 NOTE — NURSING NOTE
"Chief Complaint   Patient presents with     Insect Bites     Patient presented to the clinic with a tick bite on his upper abdomen. He found the tick this morning and didn't realize that it was a tick and ripped the back end off. He does not think that he got the whole thing out.    Initial /50 (BP Location: Left arm, Patient Position: Sitting, Cuff Size: Adult Large)   Pulse 80   Temp 97.1  F (36.2  C) (Tympanic)   Resp 18   Ht 1.892 m (6' 2.5\")   Wt 88 kg (194 lb)   SpO2 98%   BMI 24.57 kg/m   Estimated body mass index is 24.57 kg/m  as calculated from the following:    Height as of this encounter: 1.892 m (6' 2.5\").    Weight as of this encounter: 88 kg (194 lb).         Medication Reconciliation: Complete      Mary Choi LPN   "

## 2021-06-12 NOTE — PATIENT INSTRUCTIONS
Patient Education     Tick Bite, Antibiotic Treatment     Ticks are small arachnids that feed on the blood of rodents, rabbits, birds, deer, dogs and humans. The bite may cause a small reaction like that of a spider, with a small amount of limited redness, itching and slight swelling. Sometimes there is no local reaction.  Most tick bites are harmless. But some ticks carry diseases, such as Lyme disease or [place] spotted fever. These can be passed to people at the time of the bite. Lyme disease is of greatest concern. Right now you have no symptoms of Lyme disease or other serious reaction to the bite. It's important to watch for the warning signs, which could appear weeks to months after the tick bite.  Home care  These guidelines can help you care for your bite at home:    If itching is a problem, don't wear tight clothing or anything that heats up your skin. This includes hot showers or baths and direct sunlight. This often makes the itching worse.    An ice pack will reduce local areas of redness and itching. Make your own ice pack by putting ice cubes in a zip-top plastic bag and wrapping it in a thin towel. Over-the-counter creams containing benzocaine may help with itching.    You can use an antihistamine with diphenhydramine if your healthcare provider didn't give you another antihistamine. This medicine may be used to reduce itching if large areas of the skin are involved. It's available at drugstores and grocery stores. Use caution because this medicine may cause drowsiness. If symptoms continue, talk with your healthcare provider or pharmacist about other over-the counter or prescription medicines that may be helpful.    Your healthcare provider may prescribe antibiotics to reduce your risk of getting Lyme disease. It's very important that you take them exactly as directed until they are completely finished.  Follow-up care  Follow up with your healthcare provider, or as advised.  Call 911  Call  911 if any of these occur:    Irregular or rapid heartbeat    Numbness, tingling, or weakness in the arms or legs  When to seek medical advice  Call your healthcare provider right away if any of these occur:  Signs of local infection. Watch for these during the next few days:    Increasing redness around the bite site    Increased pain or swelling    Fever over 100.4 F (38 C), or as directed by your healthcare provider    Fluid draining from the bite area  Signs of tick-related disease. Watch for these over the next few weeks to months:    Circular, red, ring-like rash appears at the bite area within 1 to 3 weeks    Tiredness, fever or chills, nausea or vomiting    Neck pain or stiffness, headache, or confusion    Muscle or bone aches    Joint pain or swelling, especially in the knee    Weakness on one side of the face    A spotted rash that starts on or includes the palms of the hands and soles of the feet  [employer] last reviewed this educational content on 12/1/2019 2000-2021 [employer]. All rights reserved. This information is not intended as a substitute for professional medical care. Always follow your healthcare professional's instructions.

## 2021-06-14 ENCOUNTER — HOSPITAL ENCOUNTER (OUTPATIENT)
Dept: CT IMAGING | Facility: OTHER | Age: 76
End: 2021-06-14
Attending: SURGERY
Payer: MEDICARE

## 2021-06-14 DIAGNOSIS — I71.40 ABDOMINAL AORTIC ANEURYSM (AAA) WITHOUT RUPTURE (H): ICD-10-CM

## 2021-06-14 LAB
CREAT SERPL-MCNC: 1.22 MG/DL (ref 0.7–1.3)
GFR SERPL CREATININE-BSD FRML MDRD: 58 ML/MIN/{1.73_M2}

## 2021-06-14 PROCEDURE — 71275 CT ANGIOGRAPHY CHEST: CPT

## 2021-06-14 PROCEDURE — 76377 3D RENDER W/INTRP POSTPROCES: CPT

## 2021-06-14 PROCEDURE — 250N000011 HC RX IP 250 OP 636

## 2021-06-14 PROCEDURE — 36415 COLL VENOUS BLD VENIPUNCTURE: CPT | Mod: ZL | Performed by: SURGERY

## 2021-06-14 PROCEDURE — 82565 ASSAY OF CREATININE: CPT | Mod: ZL | Performed by: SURGERY

## 2021-06-14 RX ORDER — IOPAMIDOL 755 MG/ML
100 INJECTION, SOLUTION INTRAVASCULAR ONCE
Status: COMPLETED | OUTPATIENT
Start: 2021-06-14 | End: 2021-06-14

## 2021-06-14 RX ADMIN — IOPAMIDOL 100 ML: 755 INJECTION, SOLUTION INTRAVENOUS at 10:07

## 2021-08-02 ENCOUNTER — OFFICE VISIT (OUTPATIENT)
Dept: FAMILY MEDICINE | Facility: OTHER | Age: 76
End: 2021-08-02
Attending: FAMILY MEDICINE
Payer: MEDICARE

## 2021-08-02 VITALS
RESPIRATION RATE: 20 BRPM | WEIGHT: 191.2 LBS | BODY MASS INDEX: 23.77 KG/M2 | HEIGHT: 75 IN | TEMPERATURE: 97.2 F | HEART RATE: 92 BPM | SYSTOLIC BLOOD PRESSURE: 144 MMHG | OXYGEN SATURATION: 99 % | DIASTOLIC BLOOD PRESSURE: 65 MMHG

## 2021-08-02 DIAGNOSIS — R53.83 FATIGUE, UNSPECIFIED TYPE: Primary | ICD-10-CM

## 2021-08-02 DIAGNOSIS — R59.9 ENLARGED LYMPH NODES: ICD-10-CM

## 2021-08-02 LAB
ALBUMIN SERPL-MCNC: 4.3 G/DL (ref 3.5–5.7)
ALP SERPL-CCNC: 64 U/L (ref 34–104)
ALT SERPL W P-5'-P-CCNC: 15 U/L (ref 7–52)
ANION GAP SERPL CALCULATED.3IONS-SCNC: 11 MMOL/L (ref 3–14)
AST SERPL W P-5'-P-CCNC: 17 U/L (ref 13–39)
BASOPHILS # BLD AUTO: 0 10E3/UL (ref 0–0.2)
BASOPHILS NFR BLD AUTO: 0 %
BILIRUB SERPL-MCNC: 1 MG/DL (ref 0.3–1)
BUN SERPL-MCNC: 20 MG/DL (ref 7–25)
CALCIUM SERPL-MCNC: 9.7 MG/DL (ref 8.6–10.3)
CHLORIDE BLD-SCNC: 90 MMOL/L (ref 98–107)
CO2 SERPL-SCNC: 23 MMOL/L (ref 21–31)
CREAT SERPL-MCNC: 1.05 MG/DL (ref 0.7–1.3)
EOSINOPHIL # BLD AUTO: 0.1 10E3/UL (ref 0–0.7)
EOSINOPHIL NFR BLD AUTO: 1 %
ERYTHROCYTE [DISTWIDTH] IN BLOOD BY AUTOMATED COUNT: 13.2 % (ref 10–15)
GFR SERPL CREATININE-BSD FRML MDRD: 70 ML/MIN/1.73M2
GLUCOSE BLD-MCNC: 123 MG/DL (ref 70–105)
HCT VFR BLD AUTO: 40.9 % (ref 40–53)
HGB BLD-MCNC: 14.7 G/DL (ref 13.3–17.7)
IMM GRANULOCYTES # BLD: 0.1 10E3/UL
IMM GRANULOCYTES NFR BLD: 0 %
LYMPHOCYTES # BLD AUTO: 1.4 10E3/UL (ref 0.8–5.3)
LYMPHOCYTES NFR BLD AUTO: 11 %
MCH RBC QN AUTO: 32.7 PG (ref 26.5–33)
MCHC RBC AUTO-ENTMCNC: 35.9 G/DL (ref 31.5–36.5)
MCV RBC AUTO: 91 FL (ref 78–100)
MONOCYTES # BLD AUTO: 1.1 10E3/UL (ref 0–1.3)
MONOCYTES NFR BLD AUTO: 9 %
NEUTROPHILS # BLD AUTO: 9.4 10E3/UL (ref 1.6–8.3)
NEUTROPHILS NFR BLD AUTO: 79 %
NRBC # BLD AUTO: 0 10E3/UL
NRBC BLD AUTO-RTO: 0 /100
PLATELET # BLD AUTO: 235 10E3/UL (ref 150–450)
POTASSIUM BLD-SCNC: 4 MMOL/L (ref 3.5–5.1)
PROT SERPL-MCNC: 6.3 G/DL (ref 6.4–8.9)
RBC # BLD AUTO: 4.5 10E6/UL (ref 4.4–5.9)
SODIUM SERPL-SCNC: 124 MMOL/L (ref 134–144)
WBC # BLD AUTO: 12 10E3/UL (ref 4–11)

## 2021-08-02 PROCEDURE — 99214 OFFICE O/P EST MOD 30 MIN: CPT | Performed by: FAMILY MEDICINE

## 2021-08-02 PROCEDURE — 82040 ASSAY OF SERUM ALBUMIN: CPT | Mod: ZL | Performed by: FAMILY MEDICINE

## 2021-08-02 PROCEDURE — 85025 COMPLETE CBC W/AUTO DIFF WBC: CPT | Mod: ZL | Performed by: FAMILY MEDICINE

## 2021-08-02 PROCEDURE — G0463 HOSPITAL OUTPT CLINIC VISIT: HCPCS

## 2021-08-02 PROCEDURE — 36415 COLL VENOUS BLD VENIPUNCTURE: CPT | Mod: ZL | Performed by: FAMILY MEDICINE

## 2021-08-02 ASSESSMENT — PAIN SCALES - GENERAL: PAINLEVEL: MODERATE PAIN (4)

## 2021-08-02 ASSESSMENT — MIFFLIN-ST. JEOR: SCORE: 1684.97

## 2021-08-02 NOTE — PROGRESS NOTES
"  SUBJECTIVE:   Faustino Diallo is a 75 year old male who presents to clinic today for the following health issues: Lower stomachache left groin nodule    Patient arrives here for lower stomachache fatigue.  Also notes increasing pain in his lower abdomen with standing and walking.  Finally he has noticed this lymph node enlargement to his left groin.  He recently did have a CT scan which was unremarkable except for an aneurysm.  Since his aneurysm repair he is also been experiencing fatigue.  Fatigue doesn't seem to be improving even with rest.  Bowel movements normal.  No change in his urination.  He gets up couple times a night.  Doesn't seem to be associated with urinating.        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  Patient declined.        Review of Systems     OBJECTIVE:     BP (!) 144/65   Pulse 92   Temp 97.2  F (36.2  C)   Resp 20   Ht 1.892 m (6' 2.5\")   Wt 86.7 kg (191 lb 3.2 oz)   SpO2 99%   " BMI 24.22 kg/m    Body mass index is 24.22 kg/m .  Physical Exam  Constitutional:       Appearance: Normal appearance.   Cardiovascular:      Rate and Rhythm: Normal rate.   Abdominal:      General: Abdomen is flat. There is no distension.      Palpations: Abdomen is soft. There is no mass.      Tenderness: There is no abdominal tenderness. There is no guarding.      Hernia: No hernia is present.      Comments: Bladder not palpated   Genitourinary:     Comments: Lymph node very hard about a centimeter.  Not fixed  Neurological:      Mental Status: He is alert.         Diagnostic Test Results:  Results for orders placed or performed in visit on 08/02/21 (from the past 24 hour(s))   Comprehensive Metabolic Panel   Result Value Ref Range    Sodium 124 (L) 134 - 144 mmol/L    Potassium 4.0 3.5 - 5.1 mmol/L    Chloride 90 (L) 98 - 107 mmol/L    Carbon Dioxide (CO2) 23 21 - 31 mmol/L    Anion Gap 11 3 - 14 mmol/L    Urea Nitrogen 20 7 - 25 mg/dL    Creatinine 1.05 0.70 - 1.30 mg/dL    Calcium 9.7 8.6 - 10.3 mg/dL    Glucose 123 (H) 70 - 105 mg/dL    Alkaline Phosphatase 64 34 - 104 U/L    AST 17 13 - 39 U/L    ALT 15 7 - 52 U/L    Protein Total 6.3 (L) 6.4 - 8.9 g/dL    Albumin 4.3 3.5 - 5.7 g/dL    Bilirubin Total 1.0 0.3 - 1.0 mg/dL    GFR Estimate 70 >60 mL/min/1.73m2   CBC and Differential    Narrative    The following orders were created for panel order CBC and Differential.  Procedure                               Abnormality         Status                     ---------                               -----------         ------                     CBC with platelets and d...[[id number]]  Abnormal            Final result                 Please view results for these tests on the individual orders.   CBC with platelets and differential   Result Value Ref Range    WBC Count 12.0 (H) 4.0 - 11.0 10e3/uL    RBC Count 4.50 4.40 - 5.90 10e6/uL    Hemoglobin 14.7 13.3 - 17.7 g/dL    Hematocrit 40.9 40.0 - 53.0 %    MCV 91 78 -  100 fL    MCH 32.7 26.5 - 33.0 pg    MCHC 35.9 31.5 - 36.5 g/dL    RDW 13.2 10.0 - 15.0 %    Platelet Count 235 150 - 450 10e3/uL    % Neutrophils 79 %    % Lymphocytes 11 %    % Monocytes 9 %    % Eosinophils 1 %    % Basophils 0 %    % Immature Granulocytes 0 %    NRBCs per 100 WBC 0 <1 /100    Absolute Neutrophils 9.4 (H) 1.6 - 8.3 10e3/uL    Absolute Lymphocytes 1.4 0.8 - 5.3 10e3/uL    Absolute Monocytes 1.1 0.0 - 1.3 10e3/uL    Absolute Eosinophils 0.1 0.0 - 0.7 10e3/uL    Absolute Basophils 0.0 0.0 - 0.2 10e3/uL    Absolute Immature Granulocytes 0.1 (H) <=0.0 10e3/uL    Absolute NRBCs 0.0 10e3/uL       ASSESSMENT/PLAN:         1. Fatigue, unspecified type  Labs essentially unremarkable except for slightly elevated WBC count.  Etiology is unclear why this fatigue is persistent since the surgery.  I discussed that with both him and his daughter.  - CBC and Differential; Future  - Comprehensive Metabolic Panel; Future  - Comprehensive Metabolic Panel  - CBC and Differential  The white count has been elevated on 2 occasions but minimally.  Depending on surgery's recommendation may need a peripheral smear review  2. Enlarged lymph nodes  Referral to general surgery.  - Adult General Surg Referral; Future      Tristen Sargent MD  [facility]

## 2021-08-02 NOTE — LETTER
August 5, 2021      Faustino Diallo  [address] [place] [address]        Dear ,    We are writing to inform you of your test results.    Your test results fall within the expected range(s) or remain unchanged from previous results.  Please continue with current treatment plan.    Resulted Orders   Comprehensive Metabolic Panel   Result Value Ref Range    Sodium 124 (L) 134 - 144 mmol/L    Potassium 4.0 3.5 - 5.1 mmol/L    Chloride 90 (L) 98 - 107 mmol/L    Carbon Dioxide (CO2) 23 21 - 31 mmol/L    Anion Gap 11 3 - 14 mmol/L    Urea Nitrogen 20 7 - 25 mg/dL    Creatinine 1.05 0.70 - 1.30 mg/dL    Calcium 9.7 8.6 - 10.3 mg/dL    Glucose 123 (H) 70 - 105 mg/dL    Alkaline Phosphatase 64 34 - 104 U/L    AST 17 13 - 39 U/L    ALT 15 7 - 52 U/L    Protein Total 6.3 (L) 6.4 - 8.9 g/dL    Albumin 4.3 3.5 - 5.7 g/dL    Bilirubin Total 1.0 0.3 - 1.0 mg/dL    GFR Estimate 70 >60 mL/min/1.73m2      Comment:      As of July 11, 2021, eGFR is calculated by the CKD-EPI creatinine equation, without race adjustment. eGFR can be influenced by muscle mass, exercise, and diet. The reported eGFR is an estimation only and is only applicable if the renal function is stable.   CBC with platelets and differential   Result Value Ref Range    WBC Count 12.0 (H) 4.0 - 11.0 10e3/uL    RBC Count 4.50 4.40 - 5.90 10e6/uL    Hemoglobin 14.7 13.3 - 17.7 g/dL    Hematocrit 40.9 40.0 - 53.0 %    MCV 91 78 - 100 fL    MCH 32.7 26.5 - 33.0 pg    MCHC 35.9 31.5 - 36.5 g/dL    RDW 13.2 10.0 - 15.0 %    Platelet Count 235 150 - 450 10e3/uL    % Neutrophils 79 %    % Lymphocytes 11 %    % Monocytes 9 %    % Eosinophils 1 %    % Basophils 0 %    % Immature Granulocytes 0 %    NRBCs per 100 WBC 0 <1 /100    Absolute Neutrophils 9.4 (H) 1.6 - 8.3 10e3/uL    Absolute Lymphocytes 1.4 0.8 - 5.3 10e3/uL    Absolute Monocytes 1.1 0.0 - 1.3 10e3/uL    Absolute Eosinophils 0.1 0.0 - 0.7 10e3/uL    Absolute Basophils 0.0 0.0 - 0.2 10e3/uL     Absolute Immature Granulocytes 0.1 (H) <=0.0 10e3/uL    Absolute NRBCs 0.0 10e3/uL       If you have any questions or concerns, please call the clinic at the number listed above.       Sincerely,      Tristen Sargent MD

## 2021-08-02 NOTE — NURSING NOTE
Patient here for stomache ache that woke him up Sunday early morning also notes a swollen lymph node in the left groin. Medication Reconciliation: complete.    Inés Lopez LPN  8/2/2021 10:41 AM

## 2021-08-10 ENCOUNTER — OFFICE VISIT (OUTPATIENT)
Dept: SURGERY | Facility: OTHER | Age: 76
End: 2021-08-10
Attending: SURGERY
Payer: MEDICARE

## 2021-08-10 VITALS
RESPIRATION RATE: 18 BRPM | DIASTOLIC BLOOD PRESSURE: 58 MMHG | WEIGHT: 190 LBS | OXYGEN SATURATION: 99 % | HEIGHT: 74 IN | BODY MASS INDEX: 24.38 KG/M2 | HEART RATE: 76 BPM | TEMPERATURE: 97.4 F | SYSTOLIC BLOOD PRESSURE: 138 MMHG

## 2021-08-10 DIAGNOSIS — R59.9 ENLARGED LYMPH NODES: ICD-10-CM

## 2021-08-10 PROCEDURE — G0463 HOSPITAL OUTPT CLINIC VISIT: HCPCS

## 2021-08-10 PROCEDURE — 99203 OFFICE O/P NEW LOW 30 MIN: CPT | Performed by: SURGERY

## 2021-08-10 ASSESSMENT — MIFFLIN-ST. JEOR: SCORE: 1671.58

## 2021-08-10 ASSESSMENT — PAIN SCALES - GENERAL: PAINLEVEL: NO PAIN (0)
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
## 2021-08-10 NOTE — NURSING NOTE
"Chief Complaint   Patient presents with     Consult     enlarged groin lymph nodes       Initial /58 (BP Location: Right arm, Patient Position: Sitting, Cuff Size: Adult Large)   Pulse 76   Temp 97.4  F (36.3  C) (Tympanic)   Resp 18   Ht 1.88 m (6' 2\")   Wt 86.2 kg (190 lb)   SpO2 99%   BMI 24.39 kg/m   Estimated body mass index is 24.39 kg/m  as calculated from the following:    Height as of this encounter: 1.88 m (6' 2\").    Weight as of this encounter: 86.2 kg (190 lb).  Medication Reconciliation: complete    Nancy Valdez LPN  "

## 2021-08-16 DIAGNOSIS — I10 ESSENTIAL HYPERTENSION: ICD-10-CM

## 2021-08-18 RX ORDER — HYDROCHLOROTHIAZIDE 25 MG/1
TABLET ORAL
Qty: 90 TABLET | Refills: 3 | Status: SHIPPED | OUTPATIENT
Start: 2021-08-18 | End: 2022-01-01

## 2021-08-18 NOTE — TELEPHONE ENCOUNTER
hydrochlorothiazide (HYDRODIURIL) 25 MG tablet     Sig: TAKE 1 TABLET(25 MG) BY MOUTH DAILY           Last Written Prescription Date:  9/2/20  Last Fill Quantity: 90,   # refills: 3  Last Office Visit: 8/2/21  Future Office visit:       Routing refill request to provider for review/approval because:  Drug not on the [facility], [facility] or [place] refill protocol or controlled substance Gloria Steiner RN on 8/18/2021 at 10:30 AM

## 2021-10-01 DIAGNOSIS — I10 ESSENTIAL HYPERTENSION: ICD-10-CM

## 2021-10-04 DIAGNOSIS — I10 ESSENTIAL HYPERTENSION: ICD-10-CM

## 2021-10-04 RX ORDER — LISINOPRIL 20 MG/1
20 TABLET ORAL DAILY
Qty: 90 TABLET | Refills: 3 | Status: SHIPPED | OUTPATIENT
Start: 2021-10-04 | End: 2022-01-01

## 2021-10-04 RX ORDER — METOPROLOL TARTRATE 50 MG
50 TABLET ORAL 2 TIMES DAILY
Qty: 180 TABLET | Refills: 3 | Status: SHIPPED | OUTPATIENT
Start: 2021-10-04 | End: 2022-01-01

## 2021-10-04 NOTE — TELEPHONE ENCOUNTER
RX - Lysinoprel   RX - Metoprolol Tartae  RX-    Please refill this now.  He's out tomorrow.  Pharmacy is [facility] in [facility]      Kaitlyn Cam on 10/4/2021 at 8:41 AM

## 2021-10-04 NOTE — TELEPHONE ENCOUNTER
Routing refill request to provider for review/approval because:      LOV: and labs: 8/2/2021    Devorah Sarmiento RN on 10/4/2021 at 9:22 AM

## 2021-10-05 RX ORDER — METOPROLOL TARTRATE 50 MG
TABLET ORAL
Qty: 180 TABLET | Refills: 3 | OUTPATIENT
Start: 2021-10-05

## 2021-10-05 RX ORDER — LISINOPRIL 20 MG/1
TABLET ORAL
Qty: 90 TABLET | Refills: 3 | OUTPATIENT
Start: 2021-10-05

## 2022-01-01 ENCOUNTER — APPOINTMENT (OUTPATIENT)
Dept: CT IMAGING | Facility: OTHER | Age: 77
End: 2022-01-01
Attending: FAMILY MEDICINE
Payer: MEDICARE

## 2022-01-01 ENCOUNTER — HOSPITAL ENCOUNTER (EMERGENCY)
Facility: OTHER | Age: 77
Discharge: ANOTHER HOSPITAL | End: 2022-07-31
Attending: FAMILY MEDICINE | Admitting: FAMILY MEDICINE
Payer: MEDICARE

## 2022-01-01 ENCOUNTER — APPOINTMENT (OUTPATIENT)
Dept: GENERAL RADIOLOGY | Facility: OTHER | Age: 77
End: 2022-01-01
Attending: EMERGENCY MEDICINE
Payer: MEDICARE

## 2022-01-01 ENCOUNTER — TRANSFERRED RECORDS (OUTPATIENT)
Dept: HEALTH INFORMATION MANAGEMENT | Facility: OTHER | Age: 77
End: 2022-01-01

## 2022-01-01 ENCOUNTER — HOSPITAL ENCOUNTER (EMERGENCY)
Facility: OTHER | Age: 77
Discharge: OTHER INSTITUTION | End: 2022-10-15
Attending: EMERGENCY MEDICINE | Admitting: EMERGENCY MEDICINE
Payer: MEDICARE

## 2022-01-01 ENCOUNTER — HOSPITAL ENCOUNTER (EMERGENCY)
Facility: OTHER | Age: 77
End: 2022-11-08
Attending: FAMILY MEDICINE | Admitting: FAMILY MEDICINE
Payer: MEDICARE

## 2022-01-01 ENCOUNTER — APPOINTMENT (OUTPATIENT)
Dept: GENERAL RADIOLOGY | Facility: OTHER | Age: 77
End: 2022-01-01
Attending: FAMILY MEDICINE
Payer: MEDICARE

## 2022-01-01 VITALS
OXYGEN SATURATION: 94 % | TEMPERATURE: 98 F | SYSTOLIC BLOOD PRESSURE: 148 MMHG | DIASTOLIC BLOOD PRESSURE: 73 MMHG | HEART RATE: 124 BPM | RESPIRATION RATE: 24 BRPM

## 2022-01-01 VITALS
BODY MASS INDEX: 24.39 KG/M2 | HEART RATE: 112 BPM | SYSTOLIC BLOOD PRESSURE: 136 MMHG | RESPIRATION RATE: 17 BRPM | TEMPERATURE: 96.8 F | DIASTOLIC BLOOD PRESSURE: 73 MMHG | OXYGEN SATURATION: 98 % | WEIGHT: 190 LBS

## 2022-01-01 VITALS — HEIGHT: 74 IN | BODY MASS INDEX: 24.39 KG/M2

## 2022-01-01 DIAGNOSIS — I48.91 ATRIAL FIBRILLATION WITH RVR (H): ICD-10-CM

## 2022-01-01 DIAGNOSIS — I10 ESSENTIAL HYPERTENSION: ICD-10-CM

## 2022-01-01 DIAGNOSIS — R14.0 ABDOMINAL DISTENSION: ICD-10-CM

## 2022-01-01 DIAGNOSIS — I46.9 CARDIAC ARREST (H): ICD-10-CM

## 2022-01-01 DIAGNOSIS — I97.89 TYPE II ENDOLEAK OF AORTIC GRAFT: ICD-10-CM

## 2022-01-01 DIAGNOSIS — I71.40 ABDOMINAL AORTIC ANEURYSM (AAA) WITHOUT RUPTURE (H): ICD-10-CM

## 2022-01-01 DIAGNOSIS — K56.609 SBO (SMALL BOWEL OBSTRUCTION) (H): ICD-10-CM

## 2022-01-01 DIAGNOSIS — T82.330A LEAKAGE OF AORTIC (BIFURCATION) GRAFT (REPLACEMENT), INITIAL ENCOUNTER (H): ICD-10-CM

## 2022-01-01 DIAGNOSIS — I35.1 SEVERE AORTIC REGURGITATION: ICD-10-CM

## 2022-01-01 DIAGNOSIS — I71.43 INFRARENAL ABDOMINAL AORTIC ANEURYSM (AAA) WITHOUT RUPTURE (H): ICD-10-CM

## 2022-01-01 LAB
ALBUMIN SERPL-MCNC: 3.8 G/DL (ref 3.5–5.7)
ALBUMIN SERPL-MCNC: 4.3 G/DL (ref 3.5–5.7)
ALP SERPL-CCNC: 50 U/L (ref 34–104)
ALP SERPL-CCNC: 54 U/L (ref 34–104)
ALT SERPL W P-5'-P-CCNC: 17 U/L (ref 7–52)
ALT SERPL W P-5'-P-CCNC: 20 U/L (ref 7–52)
ANION GAP SERPL CALCULATED.3IONS-SCNC: 13 MMOL/L (ref 3–14)
ANION GAP SERPL CALCULATED.3IONS-SCNC: 14 MMOL/L (ref 3–14)
AST SERPL W P-5'-P-CCNC: 19 U/L (ref 13–39)
AST SERPL W P-5'-P-CCNC: 24 U/L (ref 13–39)
ATRIAL RATE - MUSE: 129 BPM
ATRIAL RATE - MUSE: 227 BPM
BASOPHILS # BLD AUTO: 0 10E3/UL (ref 0–0.2)
BASOPHILS # BLD AUTO: 0.1 10E3/UL (ref 0–0.2)
BASOPHILS NFR BLD AUTO: 0 %
BASOPHILS NFR BLD AUTO: 1 %
BILIRUB DIRECT SERPL-MCNC: 0.3 MG/DL (ref 0–0.2)
BILIRUB SERPL-MCNC: 1.1 MG/DL (ref 0.3–1)
BILIRUB SERPL-MCNC: 1.2 MG/DL (ref 0.3–1)
BUN SERPL-MCNC: 28 MG/DL (ref 7–25)
BUN SERPL-MCNC: 31 MG/DL (ref 7–25)
CALCIUM SERPL-MCNC: 10 MG/DL (ref 8.6–10.3)
CALCIUM SERPL-MCNC: 10.4 MG/DL (ref 8.6–10.3)
CHLORIDE BLD-SCNC: 95 MMOL/L (ref 98–107)
CHLORIDE BLD-SCNC: 99 MMOL/L (ref 98–107)
CO2 SERPL-SCNC: 22 MMOL/L (ref 21–31)
CO2 SERPL-SCNC: 25 MMOL/L (ref 21–31)
CREAT SERPL-MCNC: 1.16 MG/DL (ref 0.7–1.3)
CREAT SERPL-MCNC: 1.44 MG/DL (ref 0.7–1.3)
D DIMER PPP FEU-MCNC: 2.44 UG/ML FEU (ref 0–0.5)
DIASTOLIC BLOOD PRESSURE - MUSE: NORMAL MMHG
DIASTOLIC BLOOD PRESSURE - MUSE: NORMAL MMHG
EOSINOPHIL # BLD AUTO: 0 10E3/UL (ref 0–0.7)
EOSINOPHIL # BLD AUTO: 0.3 10E3/UL (ref 0–0.7)
EOSINOPHIL NFR BLD AUTO: 0 %
EOSINOPHIL NFR BLD AUTO: 3 %
ERYTHROCYTE [DISTWIDTH] IN BLOOD BY AUTOMATED COUNT: 14.9 % (ref 10–15)
ERYTHROCYTE [DISTWIDTH] IN BLOOD BY AUTOMATED COUNT: 15.3 % (ref 10–15)
FLUAV RNA SPEC QL NAA+PROBE: NEGATIVE
FLUAV RNA SPEC QL NAA+PROBE: NEGATIVE
FLUBV RNA RESP QL NAA+PROBE: NEGATIVE
FLUBV RNA RESP QL NAA+PROBE: NEGATIVE
GFR SERPL CREATININE-BSD FRML MDRD: 51 ML/MIN/1.73M2
GFR SERPL CREATININE-BSD FRML MDRD: 66 ML/MIN/1.73M2
GLUCOSE BLD-MCNC: 128 MG/DL (ref 70–105)
GLUCOSE BLD-MCNC: 128 MG/DL (ref 70–105)
HCT VFR BLD AUTO: 38.4 % (ref 40–53)
HCT VFR BLD AUTO: 45.8 % (ref 40–53)
HGB BLD-MCNC: 12.9 G/DL (ref 13.3–17.7)
HGB BLD-MCNC: 16 G/DL (ref 13.3–17.7)
IMM GRANULOCYTES # BLD: 0 10E3/UL
IMM GRANULOCYTES # BLD: 0.1 10E3/UL
IMM GRANULOCYTES NFR BLD: 0 %
IMM GRANULOCYTES NFR BLD: 1 %
INR PPP: 1.01 (ref 0.85–1.15)
INR PPP: 1.84 (ref 0.85–1.15)
INTERPRETATION ECG - MUSE: NORMAL
INTERPRETATION ECG - MUSE: NORMAL
LACTATE SERPL-SCNC: 1.9 MMOL/L (ref 0.7–2)
LACTATE SERPL-SCNC: 3.1 MMOL/L (ref 0.7–2)
LIPASE SERPL-CCNC: 17 U/L (ref 11–82)
LYMPHOCYTES # BLD AUTO: 0.9 10E3/UL (ref 0.8–5.3)
LYMPHOCYTES # BLD AUTO: 1.2 10E3/UL (ref 0.8–5.3)
LYMPHOCYTES NFR BLD AUTO: 10 %
LYMPHOCYTES NFR BLD AUTO: 9 %
MAGNESIUM SERPL-MCNC: 1.4 MG/DL (ref 1.9–2.7)
MCH RBC QN AUTO: 32 PG (ref 26.5–33)
MCH RBC QN AUTO: 32.5 PG (ref 26.5–33)
MCHC RBC AUTO-ENTMCNC: 33.6 G/DL (ref 31.5–36.5)
MCHC RBC AUTO-ENTMCNC: 34.9 G/DL (ref 31.5–36.5)
MCV RBC AUTO: 93 FL (ref 78–100)
MCV RBC AUTO: 95 FL (ref 78–100)
MONOCYTES # BLD AUTO: 0.6 10E3/UL (ref 0–1.3)
MONOCYTES # BLD AUTO: 0.9 10E3/UL (ref 0–1.3)
MONOCYTES NFR BLD AUTO: 6 %
MONOCYTES NFR BLD AUTO: 8 %
NEUTROPHILS # BLD AUTO: 8.6 10E3/UL (ref 1.6–8.3)
NEUTROPHILS # BLD AUTO: 9.2 10E3/UL (ref 1.6–8.3)
NEUTROPHILS NFR BLD AUTO: 77 %
NEUTROPHILS NFR BLD AUTO: 85 %
NRBC # BLD AUTO: 0 10E3/UL
NRBC # BLD AUTO: 0 10E3/UL
NRBC BLD AUTO-RTO: 0 /100
NRBC BLD AUTO-RTO: 0 /100
NT-PROBNP SERPL-MCNC: 2026 PG/ML (ref 0–100)
NT-PROBNP SERPL-MCNC: 784 PG/ML (ref 0–100)
P AXIS - MUSE: NORMAL DEGREES
P AXIS - MUSE: NORMAL DEGREES
PLATELET # BLD AUTO: 225 10E3/UL (ref 150–450)
PLATELET # BLD AUTO: 304 10E3/UL (ref 150–450)
POTASSIUM BLD-SCNC: 4.3 MMOL/L (ref 3.5–5.1)
POTASSIUM BLD-SCNC: 4.6 MMOL/L (ref 3.5–5.1)
PR INTERVAL - MUSE: NORMAL MS
PR INTERVAL - MUSE: NORMAL MS
PROT SERPL-MCNC: 6.4 G/DL (ref 6.4–8.9)
PROT SERPL-MCNC: 6.6 G/DL (ref 6.4–8.9)
QRS DURATION - MUSE: 86 MS
QRS DURATION - MUSE: 96 MS
QT - MUSE: 338 MS
QT - MUSE: 340 MS
QTC - MUSE: 493 MS
QTC - MUSE: 498 MS
R AXIS - MUSE: -25 DEGREES
R AXIS - MUSE: 8 DEGREES
RBC # BLD AUTO: 4.03 10E6/UL (ref 4.4–5.9)
RBC # BLD AUTO: 4.92 10E6/UL (ref 4.4–5.9)
RSV RNA SPEC NAA+PROBE: NEGATIVE
RSV RNA SPEC NAA+PROBE: NEGATIVE
SARS-COV-2 RNA RESP QL NAA+PROBE: NEGATIVE
SARS-COV-2 RNA RESP QL NAA+PROBE: NEGATIVE
SODIUM SERPL-SCNC: 133 MMOL/L (ref 134–144)
SODIUM SERPL-SCNC: 135 MMOL/L (ref 134–144)
SYSTOLIC BLOOD PRESSURE - MUSE: NORMAL MMHG
SYSTOLIC BLOOD PRESSURE - MUSE: NORMAL MMHG
T AXIS - MUSE: 41 DEGREES
T AXIS - MUSE: 71 DEGREES
TROPONIN I SERPL-MCNC: 13.5 PG/ML (ref 0–34)
TROPONIN I SERPL-MCNC: 27.1 PG/ML (ref 0–34)
VENTRICULAR RATE- MUSE: 128 BPM
VENTRICULAR RATE- MUSE: 129 BPM
WBC # BLD AUTO: 10.1 10E3/UL (ref 4–11)
WBC # BLD AUTO: 11.7 10E3/UL (ref 4–11)

## 2022-01-01 PROCEDURE — 80053 COMPREHEN METABOLIC PANEL: CPT | Performed by: FAMILY MEDICINE

## 2022-01-01 PROCEDURE — 82040 ASSAY OF SERUM ALBUMIN: CPT | Performed by: EMERGENCY MEDICINE

## 2022-01-01 PROCEDURE — 83880 ASSAY OF NATRIURETIC PEPTIDE: CPT | Performed by: EMERGENCY MEDICINE

## 2022-01-01 PROCEDURE — 96374 THER/PROPH/DIAG INJ IV PUSH: CPT | Mod: XU | Performed by: FAMILY MEDICINE

## 2022-01-01 PROCEDURE — 83690 ASSAY OF LIPASE: CPT | Performed by: FAMILY MEDICINE

## 2022-01-01 PROCEDURE — 85025 COMPLETE CBC W/AUTO DIFF WBC: CPT | Performed by: FAMILY MEDICINE

## 2022-01-01 PROCEDURE — 83605 ASSAY OF LACTIC ACID: CPT | Performed by: EMERGENCY MEDICINE

## 2022-01-01 PROCEDURE — G1010 CDSM STANSON: HCPCS

## 2022-01-01 PROCEDURE — 258N000003 HC RX IP 258 OP 636: Performed by: FAMILY MEDICINE

## 2022-01-01 PROCEDURE — 250N000013 HC RX MED GY IP 250 OP 250 PS 637: Performed by: FAMILY MEDICINE

## 2022-01-01 PROCEDURE — 93005 ELECTROCARDIOGRAM TRACING: CPT | Performed by: EMERGENCY MEDICINE

## 2022-01-01 PROCEDURE — 999N000065 XR ABDOMEN PORT 1 VIEW

## 2022-01-01 PROCEDURE — 99291 CRITICAL CARE FIRST HOUR: CPT | Mod: 25 | Performed by: EMERGENCY MEDICINE

## 2022-01-01 PROCEDURE — 250N000011 HC RX IP 250 OP 636: Performed by: FAMILY MEDICINE

## 2022-01-01 PROCEDURE — 36415 COLL VENOUS BLD VENIPUNCTURE: CPT | Performed by: EMERGENCY MEDICINE

## 2022-01-01 PROCEDURE — 85025 COMPLETE CBC W/AUTO DIFF WBC: CPT | Performed by: EMERGENCY MEDICINE

## 2022-01-01 PROCEDURE — 85610 PROTHROMBIN TIME: CPT | Performed by: FAMILY MEDICINE

## 2022-01-01 PROCEDURE — 84484 ASSAY OF TROPONIN QUANT: CPT | Performed by: FAMILY MEDICINE

## 2022-01-01 PROCEDURE — 96375 TX/PRO/DX INJ NEW DRUG ADDON: CPT | Performed by: FAMILY MEDICINE

## 2022-01-01 PROCEDURE — 80053 COMPREHEN METABOLIC PANEL: CPT | Performed by: EMERGENCY MEDICINE

## 2022-01-01 PROCEDURE — C9803 HOPD COVID-19 SPEC COLLECT: HCPCS | Performed by: FAMILY MEDICINE

## 2022-01-01 PROCEDURE — 83880 ASSAY OF NATRIURETIC PEPTIDE: CPT | Performed by: FAMILY MEDICINE

## 2022-01-01 PROCEDURE — 82248 BILIRUBIN DIRECT: CPT | Performed by: FAMILY MEDICINE

## 2022-01-01 PROCEDURE — 85379 FIBRIN DEGRADATION QUANT: CPT | Performed by: EMERGENCY MEDICINE

## 2022-01-01 PROCEDURE — 99207 PR SATISFY VISIT NUMBER: CPT | Performed by: SURGERY

## 2022-01-01 PROCEDURE — 87637 SARSCOV2&INF A&B&RSV AMP PRB: CPT | Performed by: FAMILY MEDICINE

## 2022-01-01 PROCEDURE — 93010 ELECTROCARDIOGRAM REPORT: CPT | Performed by: INTERNAL MEDICINE

## 2022-01-01 PROCEDURE — 99285 EMERGENCY DEPT VISIT HI MDM: CPT | Performed by: FAMILY MEDICINE

## 2022-01-01 PROCEDURE — 84484 ASSAY OF TROPONIN QUANT: CPT | Performed by: EMERGENCY MEDICINE

## 2022-01-01 PROCEDURE — 83735 ASSAY OF MAGNESIUM: CPT | Performed by: FAMILY MEDICINE

## 2022-01-01 PROCEDURE — 99291 CRITICAL CARE FIRST HOUR: CPT | Mod: 25 | Performed by: FAMILY MEDICINE

## 2022-01-01 PROCEDURE — 96376 TX/PRO/DX INJ SAME DRUG ADON: CPT | Performed by: FAMILY MEDICINE

## 2022-01-01 PROCEDURE — 99284 EMERGENCY DEPT VISIT MOD MDM: CPT | Performed by: FAMILY MEDICINE

## 2022-01-01 PROCEDURE — 93005 ELECTROCARDIOGRAM TRACING: CPT | Performed by: FAMILY MEDICINE

## 2022-01-01 PROCEDURE — 999N000157 HC STATISTIC RCP TIME EA 10 MIN

## 2022-01-01 PROCEDURE — 250N000009 HC RX 250: Performed by: FAMILY MEDICINE

## 2022-01-01 PROCEDURE — 71045 X-RAY EXAM CHEST 1 VIEW: CPT | Mod: TC

## 2022-01-01 PROCEDURE — 36415 COLL VENOUS BLD VENIPUNCTURE: CPT | Performed by: FAMILY MEDICINE

## 2022-01-01 PROCEDURE — C9803 HOPD COVID-19 SPEC COLLECT: HCPCS | Performed by: EMERGENCY MEDICINE

## 2022-01-01 PROCEDURE — 85610 PROTHROMBIN TIME: CPT | Performed by: EMERGENCY MEDICINE

## 2022-01-01 PROCEDURE — 99292 CRITICAL CARE ADDL 30 MIN: CPT | Performed by: FAMILY MEDICINE

## 2022-01-01 PROCEDURE — 83605 ASSAY OF LACTIC ACID: CPT | Performed by: FAMILY MEDICINE

## 2022-01-01 PROCEDURE — 250N000013 HC RX MED GY IP 250 OP 250 PS 637: Performed by: EMERGENCY MEDICINE

## 2022-01-01 PROCEDURE — 99285 EMERGENCY DEPT VISIT HI MDM: CPT | Performed by: EMERGENCY MEDICINE

## 2022-01-01 RX ORDER — AMLODIPINE BESYLATE 5 MG/1
TABLET ORAL
Qty: 30 TABLET | Refills: 5 | Status: SHIPPED | OUTPATIENT
Start: 2022-01-01 | End: 2022-01-01

## 2022-01-01 RX ORDER — ONDANSETRON 2 MG/ML
4 INJECTION INTRAMUSCULAR; INTRAVENOUS
Status: DISCONTINUED | OUTPATIENT
Start: 2022-01-01 | End: 2022-01-01 | Stop reason: HOSPADM

## 2022-01-01 RX ORDER — HYDROCHLOROTHIAZIDE 25 MG/1
TABLET ORAL
Qty: 90 TABLET | OUTPATIENT
Start: 2022-01-01

## 2022-01-01 RX ORDER — FUROSEMIDE 20 MG
20 TABLET ORAL DAILY
COMMUNITY
Start: 2022-01-01

## 2022-01-01 RX ORDER — ACETAMINOPHEN 500 MG
1000 TABLET ORAL ONCE
Status: COMPLETED | OUTPATIENT
Start: 2022-01-01 | End: 2022-01-01

## 2022-01-01 RX ORDER — METOPROLOL SUCCINATE 50 MG/1
50 TABLET, EXTENDED RELEASE ORAL
COMMUNITY
Start: 2022-01-01

## 2022-01-01 RX ORDER — METOPROLOL TARTRATE 50 MG
TABLET ORAL
Qty: 180 TABLET | Refills: 0 | Status: SHIPPED | OUTPATIENT
Start: 2022-01-01 | End: 2022-01-01

## 2022-01-01 RX ORDER — LISINOPRIL 20 MG/1
TABLET ORAL
Qty: 90 TABLET | Refills: 0 | Status: SHIPPED | OUTPATIENT
Start: 2022-01-01

## 2022-01-01 RX ORDER — AMOXICILLIN 500 MG/1
CAPSULE ORAL
COMMUNITY
Start: 2022-01-01 | End: 2022-01-01

## 2022-01-01 RX ORDER — ESMOLOL HYDROCHLORIDE 10 MG/ML
10 INJECTION INTRAVENOUS EVERY 10 MIN PRN
Status: COMPLETED | OUTPATIENT
Start: 2022-01-01 | End: 2022-01-01

## 2022-01-01 RX ORDER — IOPAMIDOL 755 MG/ML
100 INJECTION, SOLUTION INTRAVASCULAR ONCE
Status: DISCONTINUED | OUTPATIENT
Start: 2022-01-01 | End: 2022-01-01 | Stop reason: HOSPADM

## 2022-01-01 RX ORDER — APIXABAN 5 MG/1
5 TABLET, FILM COATED ORAL 2 TIMES DAILY
COMMUNITY
Start: 2022-01-01

## 2022-01-01 RX ORDER — FUROSEMIDE 20 MG
20 TABLET ORAL ONCE
Status: COMPLETED | OUTPATIENT
Start: 2022-01-01 | End: 2022-01-01

## 2022-01-01 RX ORDER — AMIODARONE HYDROCHLORIDE 200 MG/1
200 TABLET ORAL DAILY
COMMUNITY
Start: 2022-01-01

## 2022-01-01 RX ORDER — ESMOLOL HYDROCHLORIDE 10 MG/ML
10 INJECTION INTRAVENOUS ONCE
Status: COMPLETED | OUTPATIENT
Start: 2022-01-01 | End: 2022-01-01

## 2022-01-01 RX ORDER — FLUTICASONE PROPIONATE AND SALMETEROL 100; 50 UG/1; UG/1
1 POWDER RESPIRATORY (INHALATION) EVERY 12 HOURS
COMMUNITY

## 2022-01-01 RX ORDER — SODIUM CHLORIDE 9 MG/ML
INJECTION, SOLUTION INTRAVENOUS CONTINUOUS
Status: DISCONTINUED | OUTPATIENT
Start: 2022-01-01 | End: 2022-01-01 | Stop reason: HOSPADM

## 2022-01-01 RX ORDER — ACETAMINOPHEN 500 MG
1000 TABLET ORAL EVERY 4 HOURS PRN
Status: DISCONTINUED | OUTPATIENT
Start: 2022-01-01 | End: 2022-01-01 | Stop reason: DRUGHIGH

## 2022-01-01 RX ORDER — TIOTROPIUM BROMIDE INHALATION SPRAY 3.12 UG/1
2 SPRAY, METERED RESPIRATORY (INHALATION) DAILY
COMMUNITY
Start: 2022-01-01

## 2022-01-01 RX ORDER — IOPAMIDOL 755 MG/ML
81 INJECTION, SOLUTION INTRAVASCULAR ONCE
Status: COMPLETED | OUTPATIENT
Start: 2022-01-01 | End: 2022-01-01

## 2022-01-01 RX ORDER — ACETAMINOPHEN 500 MG
1000 TABLET ORAL EVERY 6 HOURS PRN
Status: DISCONTINUED | OUTPATIENT
Start: 2022-01-01 | End: 2022-01-01 | Stop reason: HOSPADM

## 2022-01-01 RX ORDER — SPIRONOLACTONE 25 MG/1
25 TABLET ORAL DAILY
COMMUNITY
Start: 2022-01-01

## 2022-01-01 RX ORDER — ALBUTEROL SULFATE 90 UG/1
2 AEROSOL, METERED RESPIRATORY (INHALATION) EVERY 6 HOURS PRN
COMMUNITY

## 2022-01-01 RX ORDER — HYDROCHLOROTHIAZIDE 25 MG/1
TABLET ORAL
Qty: 30 TABLET | Refills: 1 | Status: SHIPPED | OUTPATIENT
Start: 2022-01-01 | End: 2022-01-01

## 2022-01-01 RX ORDER — IOPAMIDOL 755 MG/ML
98 INJECTION, SOLUTION INTRAVASCULAR ONCE
Status: COMPLETED | OUTPATIENT
Start: 2022-01-01 | End: 2022-01-01

## 2022-01-01 RX ADMIN — IOPAMIDOL 81 ML: 755 INJECTION, SOLUTION INTRAVENOUS at 09:07

## 2022-01-01 RX ADMIN — ESMOLOL HYDROCHLORIDE 10 MG: 100 INJECTION, SOLUTION INTRAVENOUS at 13:26

## 2022-01-01 RX ADMIN — APIXABAN 5 MG: 5 TABLET, FILM COATED ORAL at 21:14

## 2022-01-01 RX ADMIN — ESMOLOL HYDROCHLORIDE 10 MG: 100 INJECTION, SOLUTION INTRAVENOUS at 12:30

## 2022-01-01 RX ADMIN — ESMOLOL HYDROCHLORIDE 10 MG: 100 INJECTION, SOLUTION INTRAVENOUS at 12:55

## 2022-01-01 RX ADMIN — ESMOLOL HYDROCHLORIDE 10 MG: 100 INJECTION, SOLUTION INTRAVENOUS at 12:12

## 2022-01-01 RX ADMIN — FUROSEMIDE 20 MG: 20 TABLET ORAL at 21:14

## 2022-01-01 RX ADMIN — HYDROMORPHONE HYDROCHLORIDE 1 MG: 1 INJECTION, SOLUTION INTRAMUSCULAR; INTRAVENOUS; SUBCUTANEOUS at 13:00

## 2022-01-01 RX ADMIN — ESMOLOL HYDROCHLORIDE 10 MG: 100 INJECTION, SOLUTION INTRAVENOUS at 12:42

## 2022-01-01 RX ADMIN — ESMOLOL HYDROCHLORIDE 10 MG: 100 INJECTION, SOLUTION INTRAVENOUS at 13:06

## 2022-01-01 RX ADMIN — ACETAMINOPHEN 1000 MG: 500 TABLET ORAL at 16:21

## 2022-01-01 RX ADMIN — IOPAMIDOL 98 ML: 755 INJECTION, SOLUTION INTRAVENOUS at 10:19

## 2022-01-01 RX ADMIN — ACETAMINOPHEN 1000 MG: 500 TABLET ORAL at 08:19

## 2022-01-01 RX ADMIN — SODIUM CHLORIDE: 900 INJECTION, SOLUTION INTRAVENOUS at 11:24

## 2022-01-01 RX ADMIN — ACETAMINOPHEN 1000 MG: 500 TABLET ORAL at 15:47

## 2022-01-01 RX ADMIN — ONDANSETRON 4 MG: 2 INJECTION INTRAMUSCULAR; INTRAVENOUS at 11:20

## 2022-01-01 ASSESSMENT — ENCOUNTER SYMPTOMS
FEVER: 0
SLEEP DISTURBANCE: 0
ABDOMINAL DISTENTION: 1
NECK STIFFNESS: 0
EYES NEGATIVE: 1
PSYCHIATRIC NEGATIVE: 1
ABDOMINAL PAIN: 1
NAUSEA: 1
CONSTITUTIONAL NEGATIVE: 1
ENDOCRINE NEGATIVE: 1
MYALGIAS: 0
GASTROINTESTINAL NEGATIVE: 1
VOMITING: 0
DIAPHORESIS: 1
SHORTNESS OF BREATH: 1
CHILLS: 0
MUSCULOSKELETAL NEGATIVE: 1
ALLERGIC/IMMUNOLOGIC NEGATIVE: 1
NECK PAIN: 0
HEMATOLOGIC/LYMPHATIC NEGATIVE: 1
NEUROLOGICAL NEGATIVE: 1
FEVER: 0
CHILLS: 1

## 2022-01-01 ASSESSMENT — ACTIVITIES OF DAILY LIVING (ADL)
ADLS_ACUITY_SCORE: 35

## 2022-01-26 NOTE — TELEPHONE ENCOUNTER
[facility] sent Rx request for the following:      Requested Prescriptions   Pending Prescriptions Disp Refills     amLODIPine (NORVASC) 5 MG tablet [Pharmacy Med Name: AMLODIPINE BESYLATE 5MG TABLETS] 30 tablet 11     Sig: TAKE 1 TABLET(5 MG) BY MOUTH DAILY       Calcium Channel Blockers Protocol  Passed - 1/25/2022  4:01 AM          Last Prescription Date:   2/1/2021  Last Fill Qty/Refills:         30, R-11    Last Office Visit:              8/2/2021 [facility]   Future Office visit:           None noted   Routing refill request to provider for review/approval Sara Thapa RN ....................  1/26/2022   10:30 AM

## 2022-02-17 PROBLEM — T82.330A LEAKAGE OF AORTIC (BIFURCATION) GRAFT (REPLACEMENT), INITIAL ENCOUNTER (H): Status: ACTIVE | Noted: 2017-08-02

## 2022-07-28 NOTE — TELEPHONE ENCOUNTER
[facility] sent Rx request for the following:      AMLODIPINE BESYLATE 5MG TABLETS      Last Prescription Date:   1/26/2022  Last Fill Qty/Refills:         30, R-5    Last Office Visit:              8/2/2021   Future Office visit:           none    Joseph Knight RN, BSN  ....................  7/28/2022   8:39 AM

## 2022-07-31 NOTE — ED PROVIDER NOTES
History     Chief Complaint   Patient presents with     Abdominal Pain     The history is provided by the patient and medical records.     Faustino Diallo is a 76 year old male here with stomach ache and pain.  His pain has been 8 of 10 and seems to ebb and flow, but never gets better than 4 of 10 pain. This started at 2 PM yesterday and has been constant since that time. The pain seems to be worse with drinking water and he has not been eating anything due to decreased appetite. He had nausea last night, no vomiting. No fever but he has had chills and sweats. He can feel a lump on the left side of his abdomen now, which is new for him. His last BM was yesterday and normal for him. He has been making urine normally.       He has a history of AAA endograft x 2 done at [facility] in [place], the second was November 2020. At that time he had a 9 cm aneurysm with an endoleak. He has been feeling weak and fatigued since has last AAA graft which was done Dec 2020.     Allergies:  Allergies   Allergen Reactions     Seasonal Allergies Other (See Comments)     Seasonal allergies, rhinitis       Problem List:    Patient Active Problem List    Diagnosis Date Noted     Enlarged lymph nodes 08/02/2021     Priority: Medium     Sinus tachycardia 12/08/2020     Priority: Medium     Slow transit constipation 12/08/2020     Priority: Medium     Dyshidrotic eczema 09/02/2020     Priority: Medium     Other age-related cataract 08/02/2018     Priority: Medium     Essential hypertension 01/25/2018     Priority: Medium     Cardiac conduction disorder 01/25/2018     Priority: Medium     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       Elevated PSA 05/27/2015     Priority: Medium     Overview:   Declines urology consult  05/2015       Colonoscopy refused 03/19/2013     Priority: Medium     Overview:   2013 2015       Abdominal aortic aneurysm (H)  01/02/2012     Priority: Medium     Overview:   Endovascular repair 4/13 with renal artery stenting on right       Calculus of kidney 12/01/2010     Priority: Medium        Past Medical History:    Past Medical History:   Diagnosis Date     Procedure not carried out because of patient's decision        Past Surgical History:    Past Surgical History:   Procedure Laterality Date     CYSTOSCOPY      cystoscopy with IV sedation     EXTRACORPOREAL SHOCK WAVE LITHOTRIPSY (ESWL)      No Comments Provided     OTHER SURGICAL HISTORY      ,ABDOMINAL AORTIC ANEURYSM REPAIR,endovascular with right renal       Family History:    Family History   Problem Relation Age of Onset     Other - See Comments Father         dementia/Stroke     Other - See Comments Mother         Parkinson's disease/ dementia       Social History:  Marital Status:  Single [1]  Social History     Tobacco Use     Smoking status: Current Every Day Smoker     Packs/day: 0.50     Years: 45.00     Pack years: 22.50     Smokeless tobacco: Never Used     Tobacco comment: Quit smoking: enc. to stop   Vaping Use     Vaping Use: Never used   Substance Use Topics     Alcohol use: Yes     Alcohol/week: 5.8 standard drinks     Comment: 3 a day      Drug use: No     Types: Other     Comment: Drug use: No        Medications:    albuterol (PROAIR HFA/PROVENTIL HFA/VENTOLIN HFA) 108 (90 Base) MCG/ACT inhaler  amLODIPine (NORVASC) 5 MG tablet  aspirin EC 81 MG EC tablet  clopidogrel (PLAVIX) 75 MG tablet  fluticasone-salmeterol (ADVAIR) 100-50 MCG/ACT inhaler  hydrochlorothiazide (HYDRODIURIL) 25 MG tablet  lisinopril (ZESTRIL) 20 MG tablet  metoprolol tartrate (LOPRESSOR) 50 MG tablet  Multiple Vitamins-Minerals (CENTRUM SILVER 50+MEN PO)  triamcinolone (KENALOG) 0.1 % external cream          Review of Systems   Constitutional: Positive for chills and diaphoresis. Negative for fever.   Gastrointestinal: Positive for abdominal distention, abdominal pain and nausea.  Negative for vomiting.   All other systems reviewed and are negative.      Physical Exam   BP: (!) 142/76  Pulse: (!) 131  Temp: 96.8  F (36  C)  Resp: 18  Weight: 86.2 kg (190 lb)  SpO2: 97 %      Physical Exam  Vitals and nursing note reviewed.   Constitutional:       General: He is not in acute distress.     Appearance: He is well-developed. He is ill-appearing. He is not toxic-appearing.   Cardiovascular:      Rate and Rhythm: Regular rhythm. Tachycardia present.      Heart sounds: Normal heart sounds. No murmur heard.     Comments: Bilateral LE pulses seem weaker than the bilateral UE and carotid pulses, which seem very full  Pulmonary:      Effort: Pulmonary effort is normal. No respiratory distress.      Breath sounds: Normal breath sounds.   Abdominal:      General: Abdomen is protuberant. Bowel sounds are absent.      Palpations: Abdomen is soft.      Tenderness: There is abdominal tenderness in the periumbilical area and left lower quadrant.      Comments: He has an abdominal bruit which is actually heard throughout his chest and abdomen   Skin:     General: Skin is warm and dry.   Neurological:      General: No focal deficit present.      Mental Status: He is alert and oriented to person, place, and time.   Psychiatric:         Mood and Affect: Mood normal.         Behavior: Behavior normal.         Results for orders placed or performed during the hospital encounter of 07/31/22 (from the past 24 hour(s))   CBC with platelets differential    Narrative    The following orders were created for panel order CBC with platelets differential.  Procedure                               Abnormality         Status                     ---------                               -----------         ------                     CBC with platelets and d...[[id number]]  Abnormal            Final result                 Please view results for these tests on the individual orders.   INR   Result Value Ref Range    INR 1.01 0.85 -  1.15   Basic metabolic panel   Result Value Ref Range    Sodium 133 (L) 134 - 144 mmol/L    Potassium 4.3 3.5 - 5.1 mmol/L    Chloride 95 (L) 98 - 107 mmol/L    Carbon Dioxide (CO2) 25 21 - 31 mmol/L    Anion Gap 13 3 - 14 mmol/L    Urea Nitrogen 31 (H) 7 - 25 mg/dL    Creatinine 1.16 0.70 - 1.30 mg/dL    Calcium 10.4 (H) 8.6 - 10.3 mg/dL    Glucose 128 (H) 70 - 105 mg/dL    GFR Estimate 66 >60 mL/min/1.73m2   CBC with platelets and differential   Result Value Ref Range    WBC Count 10.1 4.0 - 11.0 10e3/uL    RBC Count 4.92 4.40 - 5.90 10e6/uL    Hemoglobin 16.0 13.3 - 17.7 g/dL    Hematocrit 45.8 40.0 - 53.0 %    MCV 93 78 - 100 fL    MCH 32.5 26.5 - 33.0 pg    MCHC 34.9 31.5 - 36.5 g/dL    RDW 14.9 10.0 - 15.0 %    Platelet Count 225 150 - 450 10e3/uL    % Neutrophils 85 %    % Lymphocytes 9 %    % Monocytes 6 %    % Eosinophils 0 %    % Basophils 0 %    % Immature Granulocytes 0 %    NRBCs per 100 WBC 0 <1 /100    Absolute Neutrophils 8.6 (H) 1.6 - 8.3 10e3/uL    Absolute Lymphocytes 0.9 0.8 - 5.3 10e3/uL    Absolute Monocytes 0.6 0.0 - 1.3 10e3/uL    Absolute Eosinophils 0.0 0.0 - 0.7 10e3/uL    Absolute Basophils 0.0 0.0 - 0.2 10e3/uL    Absolute Immature Granulocytes 0.0 <=0.4 10e3/uL    Absolute NRBCs 0.0 10e3/uL   Magnesium   Result Value Ref Range    Magnesium 1.4 (L) 1.9 - 2.7 mg/dL   Nt probnp inpatient (BNP)   Result Value Ref Range    N terminal Pro BNP Inpatient 784 (H) 0 - 100 pg/mL   Hepatic panel   Result Value Ref Range    Bilirubin Total 1.2 (H) 0.3 - 1.0 mg/dL    Bilirubin Direct 0.3 (H) 0.0 - 0.2 mg/dL    Protein Total 6.6 6.4 - 8.9 g/dL    Albumin 4.3 3.5 - 5.7 g/dL    Alkaline Phosphatase 50 34 - 104 U/L    AST 19 13 - 39 U/L    ALT 17 7 - 52 U/L   Lipase   Result Value Ref Range    Lipase 17 11 - 82 U/L   Asymptomatic Influenza A/B & SARS-CoV2 (COVID-19) Virus PCR Multiplex Nose    Specimen: Nose; Swab   Result Value Ref Range    Influenza A PCR Negative Negative    Influenza B PCR Negative  Negative    RSV PCR Negative Negative    SARS CoV2 PCR Negative Negative    Narrative    Testing was performed using the Xpert Xpress CoV2/Flu/RSV Assay on the [employer] GeneXpert Instrument. This test should be ordered for the detection of SARS-CoV-2 and influenza viruses in individuals who meet clinical and/or epidemiological criteria. Test performance is unknown in asymptomatic patients. This test is for in vitro diagnostic use under the FDA EUA for laboratories certified under CLIA to perform high or moderate complexity testing. This test has not been FDA cleared or approved. A negative result does not rule out the presence of PCR inhibitors in the specimen or target RNA in concentration below the limit of detection for the assay. If only one viral target is positive but coinfection with multiple targets is suspected, the sample should be re-tested with another FDA cleared, approved, or authorized test, if coinfection would change clinical management. This test was validated by the [facility]. These laboratories are certified under the Clinical  Laboratory Improvement Amendments of 1988 (CLIA-88) as qualified to perform high complexity laboratory testing.   CTA Abdomen with Contrast    Narrative    PROCEDURE: CTA ABDOMEN and pelvis W CONTRAST 7/31/2022 10:30 AM    HISTORY: abdominal distension, abdominal mass, bruit on exam, history  of AAA endograf x 2    COMPARISONS: June 2021    Meds/Dose Given: Isovue 370, 98 mL    TECHNIQUE: CT angiogram of the abdomen and pelvis was performed.  Sagittal and coronal MIPS reconstructions were obtained.    FINDINGS: The lower thoracic aorta appears normal. There is a 6 aortic  stent graft extending suprarenally. There is a celiac stent which is  widely patent. There is a superior mesenteric artery stent which is  widely patent. There are bilateral renal artery stents that appear  widely patent.    The aortic stent graft is patent both limbs of the aortic  stent graft  appear normal. Both internal and external iliac arteries are widely  patent.    There is an endoleak seen. The native abdominal aortic aneurysm has  enlarged from 8.9 x 8.3 cm to 11.2 x 10.9 cm.    There is some edema seen in the small bowel mesentery in the mid small  bowel and terminal ileum . There is dilated proximal small bowel loops  with air-fluid levels. The ileal bowel loops are normal in caliber.    There are multiple low-density lesions in the liver most likely cysts.  The spleen and pancreas appear normal. The adrenal glands are normal.  Right left kidneys are free of masses or hydronephrosis. The  periaortic lymph nodes are normal in caliber. The bladder and rectum  are normal. The prostate is enlarged         Impression    IMPRESSION: Aortic stent graft in place with an endoleak demonstrated.  The abdominal aortic aneurysm has enlarged significantly from previous  examination in June 2021. The aneurysm now measures 11 cm in maximal  diameter    Small bowel obstruction.    There is significant edema in the mesentery of the mid small bowel and  terminal ileum. The possibility of bowel ischemia in this segment  exists.    THADDEUS BARAKAT MD         SYSTEM ID:  [id number]   Troponin I   Result Value Ref Range    Troponin I 13.5 0.0 - 34.0 pg/mL   Lactic acid whole blood   Result Value Ref Range    Lactic Acid 1.9 0.7 - 2.0 mmol/L       Medications   sodium chloride 0.9% infusion ( Intravenous New Bag 7/31/22 1124)   ondansetron (ZOFRAN) injection 4 mg (4 mg Intravenous Given 7/31/22 1120)   esmolol (BREVIBLOC) injection 10 mg (10 mg Intravenous Given 7/31/22 1230)   iopamidol (ISOVUE-370) solution 98 mL (98 mLs Intravenous Given 7/31/22 1019)       Assessments & Plan (with Medical Decision Making)  Faustino Diallo is a 76 year old male here with stomach ache and pain.  His pain has been 8 of 10 and seems to ebb and flow, but never gets better than 4 of 10 pain. This started at 2 PM yesterday and has  been constant since that time. The pain seems to be worse with drinking water and he has not been eating anything due to decreased appetite. He had nausea last night, no vomiting. No fever but he has had chills and sweats. He can feel a lump on the left side of his abdomen now, which is new for him. His last BM was yesterday and normal for him. He has been making urine normally.   He has a history of AAA endograft x 2 done at [facility] in [place], the second was November 2020. At that time he had a 9 cm aneurysm with an endoleak. He has been feeling weak and fatigued since has last AAA graft which was done Dec 2020. He has HTN and vascular disease (on amlodipine, hydrochlorothiazide, lisinopril, metoprolol, aspirin, Plavix).  VS in the ED:    Patient Vitals for the past 24 hrs:   BP Temp Temp src Pulse Resp SpO2 Weight   07/31/22 1230 (!) 142/84 -- -- (!) 129 -- 95 % --   07/31/22 1215 139/70 -- -- (!) 133 -- 96 % --   07/31/22 1200 (!) 152/76 -- -- (!) 140 (!) 33 96 % --   07/31/22 1150 133/79 -- -- (!) 121 23 97 % --   07/31/22 1140 -- -- -- (!) 128 22 97 % --   07/31/22 1130 (!) 147/76 -- -- (!) 128 18 96 % --   07/31/22 1120 (!) 154/96 -- -- (!) 135 19 97 % --   07/31/22 1100 (!) 143/75 -- -- 109 17 99 % --   07/31/22 1045 (!) 155/80 -- -- 111 -- 97 % --   07/31/22 1030 (!) 148/78 -- -- (!) 128 19 97 % --   07/31/22 1028 -- -- -- 117 -- 98 % --   07/31/22 1027 -- -- -- -- 14 -- --   07/31/22 1000 (!) 146/75 -- -- (!) 128 23 96 % --   07/31/22 0952 (!) 188/55 -- -- -- -- -- --   07/31/22 0950 (!) 168/81 -- -- -- -- -- --   07/31/22 0949 (!) 145/72 -- -- -- -- -- --   07/31/22 0948 (!) 151/59 -- -- -- -- -- --   07/31/22 0945 (!) 151/59 -- -- 115 25 95 % --   07/31/22 0930 (!) 141/92 -- -- 119 -- 96 % --   07/31/22 0845 (!) 142/76 96.8  F (36  C) Temporal (!) 131 18 97 % 86.2 kg (190 lb)     Exam shows a firm and tender abdomen with bruit heard, no bowel sounds. Heart and lungs sound normal but his abdominal bruit  is heard in the chest as well, concerning for a wider aortic pathology. No LE edema. EKG shows atrial fib with RVR, rate 129, normal axis.  Labs show CBC normal, BMP shows Na 133, Cl 95, glucose 128, hepatic panel with total bili 1.2 (direct 0.3), lipase normal, lactic acid 1.9, , troponin 13.5, INR 1.01.  CT report:  Aortic stent graft in place with an endoleak demonstrated.  The abdominal aortic aneurysm has enlarged significantly from previous examination in June 2021. The aneurysm now measures 11 cm in maximal diameter.  Small bowel obstruction. There is significant edema in the mesentery of the mid small bowel and terminal ileum. The possibility of bowel ischemia in this segment exists. He has been up to the bathroom and had N/V so we will place an NG. We have no beds at [facility].  I did call [facility] (patient's first choice) and they are on divert. I spoke with Jefferson and the vascular surgeon feels that there is no transfer need from a AAA/ vascular ischemia issue. They will look at issues and call back if any concerns. We will still need transfer for a bed and surgical consult for SBO with possible ischemia. [facility] did call back and they are able to take the patient to their ICU, Dr Faye accepting.     I have reviewed the nursing notes.    I have reviewed the findings, diagnosis, plan with the patient.    Final diagnoses:   SBO (small bowel obstruction) (H)   Type II endoleak of aortic graft   Abdominal aortic aneurysm (AAA) without rupture (H)       7/31/2022   [facility] Emergency Department, Amauri Perez MD  07/31/22 1247

## 2022-07-31 NOTE — CONSULTS
Virtual Vascular Surgery Consult    77 y/o male who had a juxtarenal endovascular aortic aneurysm repair in 2013 then developed a type 1a endoleak necessitating a four vessel fenestrated FEVAR in 2020 at [facility] when it was 9cm who has been followed in [place]. Comes into GI today with hypertension and signs of bowel obstruction.     Review of today's CTA shows a patent functioning 4 vessel FEVAR with no Type 1a or Type Ib endoelak. CTA 1 year ago showed the sac to measure 9.5cm with a likely Type 2 endoleak. Today's excluded sac measures 11cm again with no Type 1 endoleak but likely same Type II previoulsy imaged.    A/P: 77 y/o male s/p EVAR in 2013 then 4 vessel fenestrated  FEVAR in 2020 without leak, bleed or Type 1 endoleak. He does have persistent Type 2 endoleak in the excluded sac with growth over the past year. No acute Vascular needs. After he recovers from his bowel obstruction, he should be referred back to IR at [facility] in [place] for an outpatient angiogram for Type 2 endoleak. This should not be done while he is hospitalized for a SBO due to the high risk of translocation and possibly infecting EVAR.    Thank you for reaching out!  Ioana Galaviz MD, DFSVS, RPVI  Director, [facility] Vascular Services  Chief, Vascular and Endovascular Surgery  [facility]  [email or web address]  Pager: 1. Send message or 10 digit call back number Securely via [employer] with the [employer] Web Console (learn more here)              2. Outside of [facility]? Call [phone]

## 2022-07-31 NOTE — ED TRIAGE NOTES
Patient states he has abdominal pain on the left side.  States it is firm.  It has been brought up in the past to a provider at [facility] but it really wasn't pursued.     Triage Assessment     Row Name 07/31/22 0847       Triage Assessment (Adult)    Airway WDL WDL       Respiratory WDL    Respiratory WDL WDL       Skin Circulation/Temperature WDL    Skin Circulation/Temperature WDL WDL       Cardiac WDL    Cardiac WDL WDL       Peripheral/Neurovascular WDL    Peripheral Neurovascular WDL WDL       Cognitive/Neuro/Behavioral WDL    Cognitive/Neuro/Behavioral WDL WDL

## 2022-08-15 NOTE — TELEPHONE ENCOUNTER
[facility] sent Rx request for the following:      HYDROCHLOROTHIAZIDE 25MG TABLETS      Last Prescription Date:   8/15/2022  Last Fill Qty/Refills:         30, R-1        Routing refill request to provider for review/approval because:  Patient requesting 90 day supply but is due for annual visit.     Joseph Knight RN, BSN  ....................  8/15/2022   4:25 PM

## 2022-08-15 NOTE — TELEPHONE ENCOUNTER
[facility] sent Rx request for the following:      HYDROCHLOROTHIAZIDE 25MG TABLETS      Last Prescription Date:   8/18/2021  Last Fill Qty/Refills:         90, R-3    Last Office Visit:              8/2/2021   Future Office visit:           none    Joseph Knight RN, BSN  ....................  8/15/2022   1:48 PM

## 2022-08-17 NOTE — TELEPHONE ENCOUNTER
Patient states most of his care will now be through [facility]. Declined to schedule.    Lyn Arauz on 8/17/2022 at 3:56 PM

## 2022-09-27 NOTE — TELEPHONE ENCOUNTER
[facility] sent Rx request for the following:      Requested Prescriptions   Pending Prescriptions Disp Refills     metoprolol tartrate (LOPRESSOR) 50 MG tablet [Pharmacy Med Name: METOPROLOL TARTRATE 50MG TABLETS] 180 tablet 3     Sig: TAKE 1 TABLET(50 MG) BY MOUTH TWICE DAILY   Last Prescription Date:   10/4/21  Last Fill Qty/Refills:         180, R-3      Beta-Blockers Protocol Failed - 9/27/2022  4:31 PM        Failed - Recent (12 mo) or future (30 days) visit within the authorizing provider's specialty        lisinopril (ZESTRIL) 20 MG tablet [Pharmacy Med Name: LISINOPRIL 20MG TABLETS] 90 tablet 3     Sig: TAKE 1 TABLET(20 MG) BY MOUTH DAILY   Last Prescription Date:   10/4/21  Last Fill Qty/Refills:         90, R-3      ACE Inhibitors (Including Combos) Protocol Failed - 9/27/2022  4:31 PM        Failed - Recent (12 mo) or future (30 days) visit within the authorizing provider's specialty     Last Office Visit:              8/2/21   Future Office visit:           None    Pt due for annual exam. Routing to provider for refill consideration. Routing to  OUTREACH APPT REQUESTS pool, to assist Pt in scheduling appointment.     Kim Coto RN .............. 9/27/2022  4:36 PM

## 2022-09-28 NOTE — TELEPHONE ENCOUNTER
Called and spoke with pharmacist Devan at [facility], after verifying Pt's last name and . He was informed of information below and he verbalized plan to cancel prescriptions for metoprolol, lisinopril and hydrochlorathiazide. Kim Coto RN .............. 2022  2:01 PM

## 2022-09-28 NOTE — TELEPHONE ENCOUNTER
Patient states that he is now doctoring through [facility] and uses [facility] pharmacy. These prescriptions can/should be cancelled.    Lyn Arauz on 9/28/2022 at 12:54 PM

## 2022-10-14 PROBLEM — I35.1 SEVERE AORTIC REGURGITATION: Status: ACTIVE | Noted: 2022-01-01

## 2022-10-14 PROBLEM — I48.91 ATRIAL FIBRILLATION WITH RVR (H): Status: ACTIVE | Noted: 2022-01-01

## 2022-10-14 NOTE — ED PROVIDER NOTES
History     Chief Complaint   Patient presents with     Shortness of Breath     Palpitations     HPI  Faustino Diallo is a 76 year old male who presents today with complaints of shortness of breath and palpitations.  Patient has had symptoms in the last few days.  Patient well-known to the cardiology clinic and has a previous history of AAA, aortic and abdominal aneurysms is actually scheduled for a open heart surgery to replace the defective valve, repair an aortic aneurysm and also to do an ablation of his atrial fibrillation.  Patient followed closely by the cardiology clinic.  Patient denies any chest pain.  States that his doctor is aware of his increasing shortness of breath and actually placed him on diuretics. Has not taken pills yet.  Patient here today for some relief till he can make it to his surgery next week.    Allergies:  Allergies   Allergen Reactions     Seasonal Allergies Other (See Comments)     Seasonal allergies, rhinitis       Problem List:    Patient Active Problem List    Diagnosis Date Noted     Enlarged lymph nodes 08/02/2021     Priority: Medium     Sinus tachycardia 12/08/2020     Priority: Medium     Slow transit constipation 12/08/2020     Priority: Medium     Dyshidrotic eczema 09/02/2020     Priority: Medium     Other age-related cataract 08/02/2018     Priority: Medium     Essential hypertension 01/25/2018     Priority: Medium     Cardiac conduction disorder 01/25/2018     Priority: Medium     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       Elevated PSA 05/27/2015     Priority: Medium     Overview:   Declines urology consult  05/2015       Colonoscopy refused 03/19/2013     Priority: Medium     Overview:   2013 2015       Abdominal aortic aneurysm 01/02/2012     Priority: Medium     Overview:   Endovascular repair 4/13 with renal artery stenting on right       Calculus of kidney  12/01/2010     Priority: Medium        Past Medical History:    Past Medical History:   Diagnosis Date     Procedure not carried out because of patient's decision        Past Surgical History:    Past Surgical History:   Procedure Laterality Date     CYSTOSCOPY      cystoscopy with IV sedation     EXTRACORPOREAL SHOCK WAVE LITHOTRIPSY (ESWL)      No Comments Provided     OTHER SURGICAL HISTORY      ,ABDOMINAL AORTIC ANEURYSM REPAIR,endovascular with right renal       Family History:    Family History   Problem Relation Age of Onset     Other - See Comments Father         dementia/Stroke     Other - See Comments Mother         Parkinson's disease/ dementia       Social History:  Marital Status:  Single [1]  Social History     Tobacco Use     Smoking status: Every Day     Packs/day: 0.50     Years: 45.00     Pack years: 22.50     Types: Cigarettes     Smokeless tobacco: Never     Tobacco comments:     Quit smoking: enc. to stop   Vaping Use     Vaping Use: Never used   Substance Use Topics     Alcohol use: Yes     Alcohol/week: 5.8 standard drinks     Comment: 3 a day      Drug use: No     Types: Other     Comment: Drug use: No        Medications:    albuterol (PROAIR HFA/PROVENTIL HFA/VENTOLIN HFA) 108 (90 Base) MCG/ACT inhaler  amiodarone (PACERONE) 200 MG tablet  amLODIPine (NORVASC) 5 MG tablet  amoxicillin (AMOXIL) 500 MG capsule  aspirin EC 81 MG EC tablet  clopidogrel (PLAVIX) 75 MG tablet  ELIQUIS ANTICOAGULANT 5 MG tablet  fluticasone-salmeterol (ADVAIR) 100-50 MCG/ACT inhaler  furosemide (LASIX) 20 MG tablet  hydrochlorothiazide (HYDRODIURIL) 25 MG tablet  lisinopril (ZESTRIL) 20 MG tablet  metoprolol tartrate (LOPRESSOR) 50 MG tablet  Multiple Vitamins-Minerals (CENTRUM SILVER 50+MEN PO)  SPIRIVA RESPIMAT 2.5 MCG/ACT inhaler  spironolactone (ALDACTONE) 25 MG tablet  triamcinolone (KENALOG) 0.1 % external cream          Review of Systems   Constitutional: Negative.  Negative for chills and fever.    HENT: Negative.    Eyes: Negative.    Respiratory: Positive for shortness of breath.    Cardiovascular: Negative for chest pain.   Gastrointestinal: Negative.    Endocrine: Negative.    Genitourinary: Negative.    Musculoskeletal: Negative.  Negative for myalgias, neck pain and neck stiffness.   Skin: Negative.    Allergic/Immunologic: Negative.    Neurological: Negative.    Hematological: Negative.    Psychiatric/Behavioral: Negative.  Negative for self-injury, sleep disturbance and suicidal ideas.       Physical Exam   BP: 123/74  Pulse: (!) 142  Temp: 97.1  F (36.2  C)  Resp: 22  SpO2: 97 %      Physical Exam  Constitutional:       General: He is not in acute distress.     Appearance: He is well-developed and normal weight. He is not toxic-appearing.   Cardiovascular:      Rate and Rhythm: Tachycardia present.   Pulmonary:      Effort: Pulmonary effort is normal.      Breath sounds: Examination of the right-lower field reveals rhonchi. Rhonchi present. No decreased breath sounds or rales.   Musculoskeletal:      Cervical back: Normal range of motion.   Skin:     General: Skin is warm.      Capillary Refill: Capillary refill takes less than 2 seconds.   Neurological:      General: No focal deficit present.      Mental Status: He is alert.   Psychiatric:         Mood and Affect: Mood normal.         Behavior: Behavior normal.         ED Course              ED Course as of 10/14/22 2031   Fri Oct 14, 2022   0714 Signed out to Dr. Lara. Needs further evaluation and poss CTA of the chest, cardiology consultation.      Procedures    EKG - atrial fibrillation without ST elevation       Results for orders placed or performed during the hospital encounter of 10/14/22 (from the past 24 hour(s))   CBC with platelets differential    Narrative    The following orders were created for panel order CBC with platelets differential.  Procedure                               Abnormality         Status                      ---------                               -----------         ------                     CBC with platelets and d...[[id number]]  Abnormal            Final result                 Please view results for these tests on the individual orders.   D dimer quantitative   Result Value Ref Range    D-Dimer Quantitative 2.44 (H) 0.00 - 0.50 ug/mL FEU    Narrative    This D-dimer assay is intended for use in conjunction with a clinical pretest probability assessment model to exclude pulmonary embolism (PE) and deep venous thrombosis (DVT) in outpatients suspected of PE or DVT. The cut-off value is 0.50 ug/mL FEU.   INR   Result Value Ref Range    INR 1.84 (H) 0.85 - 1.15   Comprehensive metabolic panel   Result Value Ref Range    Sodium 135 134 - 144 mmol/L    Potassium 4.6 3.5 - 5.1 mmol/L    Chloride 99 98 - 107 mmol/L    Carbon Dioxide (CO2) 22 21 - 31 mmol/L    Anion Gap 14 3 - 14 mmol/L    Urea Nitrogen 28 (H) 7 - 25 mg/dL    Creatinine 1.44 (H) 0.70 - 1.30 mg/dL    Calcium 10.0 8.6 - 10.3 mg/dL    Glucose 128 (H) 70 - 105 mg/dL    Alkaline Phosphatase 54 34 - 104 U/L    AST 24 13 - 39 U/L    ALT 20 7 - 52 U/L    Protein Total 6.4 6.4 - 8.9 g/dL    Albumin 3.8 3.5 - 5.7 g/dL    Bilirubin Total 1.1 (H) 0.3 - 1.0 mg/dL    GFR Estimate 51 (L) >60 mL/min/1.73m2   Lactic acid whole blood   Result Value Ref Range    Lactic Acid 3.1 (H) 0.7 - 2.0 mmol/L   Troponin I   Result Value Ref Range    Troponin I 27.1 0.0 - 34.0 pg/mL   Nt probnp inpatient (BNP)   Result Value Ref Range    N terminal Pro BNP Inpatient 2,026 (H) 0 - 100 pg/mL   CBC with platelets and differential   Result Value Ref Range    WBC Count 11.7 (H) 4.0 - 11.0 10e3/uL    RBC Count 4.03 (L) 4.40 - 5.90 10e6/uL    Hemoglobin 12.9 (L) 13.3 - 17.7 g/dL    Hematocrit 38.4 (L) 40.0 - 53.0 %    MCV 95 78 - 100 fL    MCH 32.0 26.5 - 33.0 pg    MCHC 33.6 31.5 - 36.5 g/dL    RDW 15.3 (H) 10.0 - 15.0 %    Platelet Count 304 150 - 450 10e3/uL    % Neutrophils 77 %    %  Lymphocytes 10 %    % Monocytes 8 %    % Eosinophils 3 %    % Basophils 1 %    % Immature Granulocytes 1 %    NRBCs per 100 WBC 0 <1 /100    Absolute Neutrophils 9.2 (H) 1.6 - 8.3 10e3/uL    Absolute Lymphocytes 1.2 0.8 - 5.3 10e3/uL    Absolute Monocytes 0.9 0.0 - 1.3 10e3/uL    Absolute Eosinophils 0.3 0.0 - 0.7 10e3/uL    Absolute Basophils 0.1 0.0 - 0.2 10e3/uL    Absolute Immature Granulocytes 0.1 <=0.4 10e3/uL    Absolute NRBCs 0.0 10e3/uL   XR Chest Port 1 View    Narrative    PROCEDURE INFORMATION:   Exam: XR Chest   Exam date and time: 10/14/2022 5:45 AM   Age: 76 years old   Clinical indication: Shortness of breath; Additional info: 76-year-old male   with complaints of shortness of breath. History of aortic aneurysm. Rule out   mass/pneumonia     TECHNIQUE:   Imaging protocol: Radiologic exam of the chest.   Views: 1 view.     COMPARISON:   CTA CHEST ABDOMEN PELVIS W CONTRAST 6/14/2021 9:32 AM     FINDINGS:   Lungs: Emphysema. Patchy airspace disease within the right lung most pronounced   within the right mid lung and right upper lobe.   Pleural spaces: Unremarkable. No pleural effusion. No pneumothorax.   Heart/Mediastinum: Cardiomegaly   Bones/joints: Unremarkable.       Impression    IMPRESSION:   Patchy airspace disease within the right lung most pronounced within the right   mid lung and right upper lobe.     THIS DOCUMENT HAS BEEN ELECTRONICALLY SIGNED BY VALERIA WILLETT MD   Asymptomatic Influenza A/B & SARS-CoV2 (COVID-19) Virus PCR Multiplex Nose    Specimen: Nose; Swab   Result Value Ref Range    Influenza A PCR Negative Negative    Influenza B PCR Negative Negative    RSV PCR Negative Negative    SARS CoV2 PCR Negative Negative    Narrative    Testing was performed using the Xpert Xpress CoV2/Flu/RSV Assay on the [employer] GeneXpert Instrument. This test should be ordered for the detection of SARS-CoV-2 and influenza viruses in individuals who meet clinical and/or epidemiological criteria. Test  performance is unknown in asymptomatic patients. This test is for in vitro diagnostic use under the FDA EUA for laboratories certified under CLIA to perform high or moderate complexity testing. This test has not been FDA cleared or approved. A negative result does not rule out the presence of PCR inhibitors in the specimen or target RNA in concentration below the limit of detection for the assay. If only one viral target is positive but coinfection with multiple targets is suspected, the sample should be re-tested with another FDA cleared, approved, or authorized test, if coinfection would change clinical management. This test was validated by the [facility] [employer]. These laboratories are certified under the Clinical Laboratory Improvement Amendments of 1988 (CLIA-88) as qualified to perform high complexity laboratory testing.   CT Chest Pulmonary Embolism w Contrast    Addendum: 10/14/2022    The increasing volume of the excluded aneurysm sac may also reflect a  progressive type II endoleak, as noted on the prior dated 6/14/2021.    DICKSON SINGER MD         SYSTEM ID:  [id number]      Narrative    PROCEDURE:  CT CHEST PULMONARY EMBOLISM W CONTRAST.    HISTORY:  Evaluate for pulmonary embolism.  shortness of breath, INR  subtherapeutic, elevated D dimer    TECHNIQUE:  Initial pre-contrast  and localizer images were  obtained.  Contrast enhanced helical CT pulmonary angiography was then  performed.  Routine transaxial and post-processed (multiplanar and/or  MIP) reformations were obtained. This CT exam was performed using one  or more the following dose reduction techniques: automated exposure  control, adjustment of the mA and/or kV according to patient size,  and/or iterative reconstruction technique.    COMPARISON:  10/14/2022    MEDS/CONTRAST: 81 mL Isovue-370    PULMONARY CTA FINDINGS:  This is a diagnostic quality helical CT  pulmonary angiogram.  There is no acute pulmonary embolism to  the  first subsegmental pulmonary artery level.    OTHER FINDINGS:      There is new consolidation superimposed on chronic emphysema in the  right upper lobe. There are right greater than left small pleural  effusions. The main pulmonary artery is dilated to 4.5 cm, suggesting  chronic pulmonary hypertension.     The ascending thoracic aorta is dilated up to 4.5 cm. Limited images  through the infrarenal aorta demonstrate prior endograft placement  with significant increase in the diameter of the excluded sac,  measuring at least 9.7 x 11.8 cm, previously 9.4 x 9.8 cm on  6/14/2021. Increased density is seen immediately adjacent to the graft  material.    The heart is enlarged.    No suspicious osseous lesion is identified.      Impression    IMPRESSION:    No acute pulmonary emboli to the subsegmental level.    Asymmetric new airspace consolidation in the right upper lobe is  suspicious for acute bronchopneumonia. Recommend follow-up to  resolution.    Cardiomegaly. Pulmonary hypertension. Right greater than left small  pleural effusions.    Aneurysmal dilatation of the ascending thoracic aorta up to 4.5 cm.    Prior abdominal aortic aneurysm with an endograft in place, partially  visualized. Significant interval increase in the volume of the  excluded sac as well as increased density immediately adjacent to the  stent raises the possibility of stent graft failure, type III/type IV  endoleak. Recommend vascular surgery follow-up.    DICKSON SINGER MD         SYSTEM ID:  [id number]       Medications - No data to display    Assessments & Plan (with Medical Decision Making)         New Prescriptions    No medications on file       Final diagnoses:   Atrial fibrillation with RVR (H)   Endoleak of aortic graft   Infrarenal abdominal aortic aneurysm (AAA) without rupture   Severe aortic regurgitation       10/14/2022   [facility]     Baljeet Hair MD  10/14/22 2038

## 2022-10-14 NOTE — ED NOTES
Up to ambulate to bathroom per self with no concerns. Patient did have a dish of jello for lunch but refuses anything for supper.

## 2022-10-14 NOTE — ED NOTES
Patient's condition unchanged. Wife at bedside. Given jello per request. VSS. Patient says that he has only eating bites for the 2 weeks due to has difficulty getting it down. Feels as though it hits a bubble. When cardiac issues are dealt with he is supposed to have a GI workup done.

## 2022-10-14 NOTE — ED NOTES
Patient tried off his oxygen per MD request. With only moving in bed and speaking he becomes SOB, labored and tachycardic. Therefore oxygen applied.

## 2022-10-14 NOTE — ED PROVIDER NOTES
10/14/22   7:00 AM    I am accepting the care of this patient from Dr Hair at change of shift.  Faustino Diallo is a 75 yo male here with rapid heart rate and palpitations. Currently he says that this comes and goes, with pain 4 of 10 up to 8 of 10.  He has a history of heart and vascular disease including AAA with graft (endoleak repaired 8/2022), HTN, atrial fib, sCHF (ECHO August 2022 with EF 20-25%- on amlodipine, hydrochlorothiazide, lisinopril, metoprolol, amiodarone, Plavix, aspirin, Eliquis, Lasix, spironolactone),  SAGE (on albuterol PRN, Advair, Spiriva). EKG on arrival shows atrial fib with RVR, rate 128, left axis.  Labs shows CBC with WBC 11,700, hgb 12.9, CMP with Cr 1.44, glucose 128, INR 1.84, d dimer 2.44, BNP 2026, troponin normal, 4 Plex negative.  Chest xray shows right sided patchy infiltrate. He has heart surgery scheduled for October 24 at [facility] and is now on a wait list.  He is rate controlled here with no meds.  They have been here now 2 hours.     VS on 2 L oxygen  BP (!) 140/67   Pulse 89   Temp 97.1  F (36.2  C) (Tympanic)   Resp 26   SpO2 94%     Medications   iopamidol (ISOVUE-370) solution 100 mL (has no administration in time range)   iopamidol (ISOVUE-370) solution 81 mL (has no administration in time range)   acetaminophen (TYLENOL) tablet 1,000 mg (1,000 mg Oral Given 10/14/22 0819)     ED Course    9:56 AM  CT PE study shows no PE, acute bronchopneumonia on the right and possible type III or IV endoleak of the AAA graft. This appears to be increasing.  We did push images to [facility]. Faustino has been up and around in the ED and we did trial him on room air. I spoke with Dr Carlson at [facility]- they have a CT scan from August 2022 which shows that the graft and excluded sac are almost identical to the size found today. He agrees that this is large but seems stable and can be followed up later.   Faustino has not done well on room air with sats down to 88% at times and tachycardia with  shifting in bed, with pulse over 120 at times. I think he needs to remain on oxygen and he not on oxygen at home so for now we will keep him here as we wait for a bed at [facility].  We will move him to the ICU.     7:00 PM  I am signing out his care to Dr Hair at change of shift.    Diagnosis    (I48.91) Atrial fibrillation with RVR (H)    (T82.330A) Endoleak of aortic graft    (I71.43) Infrarenal abdominal aortic aneurysm (AAA) without rupture    (I35.1) Severe aortic regurgitation      Plan: per Dr Reginaldo Lara, Amauri Perez MD  10/14/22 2988

## 2022-10-14 NOTE — ED TRIAGE NOTES
"Pt presents to ED from home for c/o SOB and palpitations. States he has an upcoming open heart surgery, aortic arch replacement and ablation for afib on 10/24. Presents today with increase SOB and \"feels like its from my afib\". Pt tachycardic in the 115-140s, dyspnea on exertion. EKG completed. Pt denies chest pain.  /74   Pulse (!) 142   Temp 97.1  F (36.2  C) (Tympanic)   Resp 22   SpO2 97%        Triage Assessment     Row Name 10/14/22 0450       Triage Assessment (Adult)    Airway WDL WDL       Respiratory WDL    Respiratory WDL X;rhythm/pattern    Rhythm/Pattern, Respiratory shortness of breath       Skin Circulation/Temperature WDL    Skin Circulation/Temperature WDL WDL       Cardiac WDL    Cardiac WDL X;rhythm    Pulse Rate & Regularity tachycardic;radial pulse irregular       Peripheral/Neurovascular WDL    Peripheral Neurovascular WDL WDL       Cognitive/Neuro/Behavioral WDL    Cognitive/Neuro/Behavioral WDL WDL              "
DISCHARGE

## 2022-10-15 NOTE — PROGRESS NOTES
Stat Doc called from [facility] requesting a status on patient. Update given and informed that they are trying to get him there asap and will call back after bed status meeting this morning.

## 2022-10-15 NOTE — PHARMACY-ADMISSION MEDICATION HISTORY
Pharmacy -- Admission Medication Reconciliation    Prior to admission (PTA) medications were reviewed and the patient's PTA medication list was updated.    Sources Consulted: chart review, sure scripts, patient interview    The reliability of this Medication Reconciliation is: Reliability: Reliable    The following significant changes were made:    Removed:    Triamcinolone cream    Metoprolol tartrate    Hydrochlorothiazide    Asprin    Amlodipine    Amoxicillin (was for prior to dental procedure last week)    Udpated:  Dose directions for amiodarone, eliquis, furosemide, metoprolol, spiriva and spironolactone    Note:    Marked advair as not taking (couldn't find confirmatioon of discontinuation in chart)    Lasix marked as not taking- new prescription did not start prior to coming to ED      In addition, the patient's allergies were reviewed with the patient and updated as follows:   Allergies: Seasonal allergies    The pharmacist has reviewed with the patient that all personal medications should be removed from the building or locked in the belongings safe.  Patient shall only take medications ordered by the physician and administered by the nursing staff.     Medication barriers identified: none noted    Medication adherence concerns: none noted   Understanding of emergency medications:  Albuterol rare use    Ashley March RPH, 10/15/2022,  3:02 PM

## 2022-10-15 NOTE — ED NOTES
Patient states that he has an appetite this morning and feeling hungry. Soft breakfast ordered. Patient does say that he gets very worn out when ambulating to the bathroom and is unable to stand and brush his teeth due to fatigue.

## 2022-10-15 NOTE — PROGRESS NOTES
SPO2 96% on 2L at rest.  Removed O2 and SPO2 91% on room air.  Ambulated patient on  room air and SPO2 91-94%.

## 2022-10-15 NOTE — ED NOTES
RT did a walking trail without oxygen to see if he would qualify for home oxygen. Reported that patient did not qualify.

## 2022-10-15 NOTE — ED NOTES
Patient does remain to get very tachycardic into 120's when ambulating or even sitting up in bed and speaking. Feels much better using oxygen when ambulating up to bathroom.

## 2022-10-15 NOTE — ED PROVIDER NOTES
10/15/22   7:00 AM    I am accepting the care of this patient from Dr Hair at change of shift.  Faustino Diallo is a 77 yo male with known aortic regurg. He has a surgery planned for Monday, October 24, but has been more short of breath with tachycardia lately and came to the ED now 26 hours ago.     ED Course    I stopped to see him in 912. He says that he feels so much better after a night on oxygen.  He has been getting up to the bathroom without oxygen and feels very short of breath and weak until he gets back into bed and back on his oxygen.  He feels much better on the oxygen. He says that he slept better last night than he has in months. He had an appetite this morning, which is also new for him.  I had him do some activity in bed (sitting forward and backwards six times in bed) and his heart rate went from 98 up to 110 and then returned to the upper 90's as soon as he quit.  I asked RT to see the patient: he did walk in the hallway on room air and did not drop below 91% oxygenation, but was significantly short of breath.  He will not qualify for home oxygen.     4:41 PM  He got a bed at [facility].     Diagnosis    (I48.91) Atrial fibrillation with RVR (H)    (T82.330A) Endoleak of aortic graft    (I71.43) Infrarenal abdominal aortic aneurysm (AAA) without rupture    (I35.1) Severe aortic regurgitation      Plan: transfer to [facility]             Amauri Lara MD  10/15/22 9660     I have reviewed and confirmed nurses' notes...

## 2022-11-08 NOTE — ED NOTES
[facility] called for an update and Amanda MONTALVO let them know the pt did  and [facility] reports they talked with the pt's sister and she will be coming in at some point.

## 2022-11-08 NOTE — ED NOTES
Writer called [facility] and talked with Elysia.  Elysia got the pt's information and will have staff at [facility] contact writer as to when they will pick pt up and take to  home.

## 2022-11-08 NOTE — ED PROVIDER NOTES
History     Chief Complaint   Patient presents with     Cardiac Arrest     The history is provided by the EMS personnel.     This is a Code Blue.    Faustino Diallo is a 76 year old male who arrived in full arrest.    Per EMS: Patient was found down by staff at [facility], unknown how long he was down.  CPR was started by staff and 911 called. EMS placed a TIM and used a NRB mask for oxygenation. They used ACLS protocol and gave 2 doses of epinephrine.  They never had pulses and pupils were fixed and dilated during the entire time they were with him.     He was seen here for atrial fib and endoleak of aortic graph on October 14. He was transferred to [facility] and had surgery October 20.     Allergies:  No Known Allergies    Problem List:    Patient Active Problem List    Diagnosis Date Noted     Severe aortic regurgitation 10/14/2022     Priority: Medium     Atrial fibrillation with RVR (H) 10/14/2022     Priority: Medium     Enlarged lymph nodes 08/02/2021     Priority: Medium     Sinus tachycardia 12/08/2020     Priority: Medium     Slow transit constipation 12/08/2020     Priority: Medium     Dyshidrotic eczema 09/02/2020     Priority: Medium     Other age-related cataract 08/02/2018     Priority: Medium     Essential hypertension 01/25/2018     Priority: Medium     Cardiac conduction disorder 01/25/2018     Priority: Medium     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       Elevated PSA 05/27/2015     Priority: Medium     Overview:   Declines urology consult  05/2015       Colonoscopy refused 03/19/2013     Priority: Medium     Overview:   2013 2015       Abdominal aortic aneurysm 01/02/2012     Priority: Medium     Overview:   Endovascular repair 4/13 with renal artery stenting on right       Calculus of kidney 12/01/2010     Priority: Medium        Past Medical History:    Past Medical History:   Diagnosis  "Date     Procedure not carried out because of patient's decision        Past Surgical History:    Past Surgical History:   Procedure Laterality Date     CYSTOSCOPY      cystoscopy with IV sedation     EXTRACORPOREAL SHOCK WAVE LITHOTRIPSY (ESWL)      No Comments Provided     OTHER SURGICAL HISTORY      ,ABDOMINAL AORTIC ANEURYSM REPAIR,endovascular with right renal       Family History:    Family History   Problem Relation Age of Onset     Other - See Comments Father         dementia/Stroke     Other - See Comments Mother         Parkinson's disease/ dementia       Social History:  Marital Status:  Single [1]  Social History     Tobacco Use     Smoking status: Every Day     Packs/day: 0.50     Years: 45.00     Pack years: 22.50     Types: Cigarettes     Smokeless tobacco: Never     Tobacco comments:     Quit smoking: enc. to stop   Vaping Use     Vaping Use: Never used   Substance Use Topics     Alcohol use: Yes     Alcohol/week: 5.8 standard drinks     Comment: 3 a day      Drug use: No     Types: Other     Comment: Drug use: No        Medications:    albuterol (PROAIR HFA/PROVENTIL HFA/VENTOLIN HFA) 108 (90 Base) MCG/ACT inhaler  amiodarone (PACERONE) 200 MG tablet  clopidogrel (PLAVIX) 75 MG tablet  ELIQUIS ANTICOAGULANT 5 MG tablet  fluticasone-salmeterol (ADVAIR) 100-50 MCG/ACT inhaler  furosemide (LASIX) 20 MG tablet  lisinopril (ZESTRIL) 20 MG tablet  metoprolol succinate ER (TOPROL XL) 50 MG 24 hr tablet  Multiple Vitamins-Minerals (CENTRUM SILVER 50+MEN PO)  SPIRIVA RESPIMAT 2.5 MCG/ACT inhaler  spironolactone (ALDACTONE) 25 MG tablet      Review of Systems   Unable to perform ROS: Acuity of condition       Physical Exam   Height: 188 cm (6' 2\")      Physical Exam  Vitals reviewed: No vital signs taken as patient is pulseless.   Constitutional:       Comments: Patient is non-responsive.    Eyes:      Comments: Pupils fixed and are about 5 mm in size   Cardiovascular:      Comments: Patient is pulseless " on arrival despite TIM running. TIM was paused and patient remained without pulse.  POCUS shows no cardiac activity at 0248.  Pulmonary:      Comments: No respiratory effort  Abdominal:      Comments: Abdomen is distended and very firm   Musculoskeletal:      Comments: Anterior chest wall is concave from the TIM with bleeding coming from his surgical chest wound   Skin:     Comments: He is pale and cold to touch           Assessments & Plan (with Medical Decision Making)  Faustino Diallo is a 76 year old male who arrived in full arrest.  Per EMS: Patient was found down by staff at [facility], unknown how long he was down.  CPR was started by staff and 911 called. EMS placed a TIM and used a NRB mask for oxygenation. They used ACLS protocol and gave 2 doses of epinephrine.  They never had pulses and pupils were fixed and dilated during the entire time they were with him.  He was seen here for atrial fib and endoleak of aortic graph on October 14. He was transferred to [facility] and had surgery October 20.  No vital signs obtained. Patient was unresponsive, pale, cold. POCUS showed no cardiac activity at 0248 and again at 0251. Time of death 0248. May he rest in peace.       Final diagnoses:   Cardiac arrest (H)       11/8/2022   [facility] AND [facility], Amauri Perez MD  11/08/22 3031

## 2022-11-08 NOTE — ED TRIAGE NOTES
Pt arrives to the ED via [facility] from [facility].  Staff found pt unresponsive at 0200.  It was unknown how long pt was down for.  EMS reports pt's pupils are fixed and dilated and has been asystole since their arrival.

## 2022-11-08 NOTE — ED NOTES
Sister came to ED to talk with provider and writer.  Darcie (sister) did not wish to see pt at this time.  Provider talked with Darcie about the events.  Darcie states that [facility] where the pt goes.  Darcie was thankful for what staff did will call if she has any further questions.

## (undated) RX ORDER — ACETAMINOPHEN 500 MG
TABLET ORAL
Status: DISPENSED
Start: 2022-01-01

## (undated) RX ORDER — ONDANSETRON 2 MG/ML
INJECTION INTRAMUSCULAR; INTRAVENOUS
Status: DISPENSED
Start: 2022-01-01